# Patient Record
Sex: MALE | Race: WHITE
[De-identification: names, ages, dates, MRNs, and addresses within clinical notes are randomized per-mention and may not be internally consistent; named-entity substitution may affect disease eponyms.]

---

## 2019-07-21 NOTE — CT
PRELIMINARY REPORT/[facility]/EMERGENCY AFTER HOURS PROCEDURE:

 

EXAM:

CT Abdomen and Pelvis With Contrast

 

EXAM DATE/TIME:

7/21/2019 12:10 AM

 

CLINICAL HISTORY:

79 years old, male; Abdominal pain; Patient HX: 80 y/o m presents to ED C/O sudden onset of llq abd p
ain, that began at approx 1800. Associated abd bloating/distension, x 1 week of constipation. PT gali
es n/v, fever, diarrhea, dysuria

 

TECHNIQUE:

Imaging protocol: Axial computed tomography images of the abdomen and pelvis with intravenous contras
t.

 

COMPARISON:

No relevant prior studies available.

 

FINDINGS:

Lungs: There is bibasilar atelectatic change or scarring.

Liver: Normal. No mass.

Gallbladder and bile ducts: Normal. No calcified stones. No ductal dilation.

Pancreas: Normal. No ductal dilation.

Spleen: There are calcified splenic granulomata.

Adrenals: Normal. No mass.

Kidneys and ureters: There is moderate left hydronephrosis and hydroureter but no visible

obstructing calculus. Cannot exclude a recently passed calculus that is no longer present.

Stomach and bowel: Normal. No obstruction. No mucosal thickening.

Appendix: No evidence of appendicitis.

Intraperitoneal space: Normal. No free air. No significant fluid collection.

Vasculature: There are atherosclerotic aortic and iliac and femoral artery calcifications.

Lymph nodes: There are calcified right hilar lymph nodes.

Bladder: There is mild wall thickening involving the left aspect of the urinary bladder, cannot exclu
de

mild cystitis.

Reproductive: Unremarkable as visualized.

Bones/joints: There is diffuse osteopenia and there are degenerative changes of the spine.

Soft tissues: Unremarkable.

 

IMPRESSION:

1. There is moderate left hydronephrosis and hydroureter but no visible obstructing calculus. Cannot

exclude a recently passed calculus that is no longer present.

2. There is mild wall thickening involving the left aspect of the urinary bladder, cannot exclude mil
d

cystitis.

 

Thank you for allowing us to participate in the care of your patient.

Dictated and Authenticated by: Isiah Erickson MD

07/21/2019 1:30 AM Central Time ([place] & [place])

 

 

 

FINAL REPORT 

 

EMERGENT AFTER HOURS CT ABDOMEN AND PELVIS WITHOUT CONTRAST:

 

FINDINGS/IMPRESSION: 

I agree with the findings and impression given in the preliminary report per [facility] physician.  There 
is moderate left hydronephrosis and hydroureter.  No definite calcification is seen.  The patient may
 have recently passed a stone.

## 2019-07-21 NOTE — CON
DATE OF CONSULTATION:  07/21/2019



HISTORY OF PRESENT ILLNESS:  This is a Urology consult, I was asked to see this

patient because of pyuria, hematuria, history of prostate cancer, and left

hydronephrosis.  I am seeing him in room [facility]17 at [facility].  I have also

reviewed his most recent records from [facility], where his

urologist Dr. Matute's office is and where lot of his office notes are.  This

patient was admitted last night with left lower back pain and left-sided abdominal

pain.  This started 2 days ago, we put up with it, then it got worse causing him to

seek admission.  He was not having any fevers, any chills, any nausea or any

vomiting with this.  He has had some lower urinary tract symptoms for a number of

years, hesitancy, urgency, frequency, nocturia q.1 hour.  "I'm straining, if

urinate," and off and on hematuria.  He is seeing Dr. Matute for this.  He has had

a cysto done that was negative.  He has had urine cultures done of the last three at

[facility], which were negative, although he does have hematuria noted on

urinalysis.  He does have a distant history of cigarette smoking, quitting over 10

years ago.  To his knowledge, he never had kidney stones.  He had an ultrasound done

of his kidneys about a year ago at [facility], but did not show any stones or

hydronephrosis.  He had a noncontrast CT scan done here that showed some left hydro,

but no distal ureteral stone, some thickening of the left side of the bladder wall.

His urinalysis here showed over 50 white cells, over 50 red cells, 1+ bacteria.  His

white count was not elevated.  His creatinine was 3, which is elevated, compared to

what it normally runs.  It was 2.2 earlier this month, 1.9 before that, and a year

ago 1.7.  In terms of his prostate cancer, it was an aggressive prostate cancer.

Dr. Matute's notes from this May when he saw him indicated that was a [place]

4+4+3, primarily on the right side, 5 of 5 positive biopsies there, and 1 of 3

positive biopsies on the left side.  He was treated with I think 2 years of Lupron

and radiation.  The radiation was completed in March of 2016, and I believe, he has

been having routine followups with Dr. Medina since then.  Dr. Matute has had him on

Flomax 0.8 and Myrbetriq to help with his frequency and urgency.  He has had a low

postvoid residual in Dr. Matute's office.  His urine culture has been set up and he

was given Rocephin in the ER, and he has been started on Levaquin IV now.  He has

been receiving some morphine for pain, and he feels like his pain is improving.  He

has not had any abnormal vital signs since he has been admitted to the hospital. 



PAST MEDICAL HISTORY:  Includes a hernia repair, prostate cancer, hypertension, and

distant history of cigarette use. 



PHYSICAL EXAMINATION:

He has some mild CVA tenderness.  His abdomen is distended, mostly tympanitic with

gas.  There is some left lower quadrant discomfort, but no rebound, no guarding.  He

is not circumcised.  There is no phimosis or lesions noted.  The testicles are

descended without mass or tenderness.  Rectal exam reveals a small flat prostate. 



IMPRESSION:  

1. Pyuria, hematuria and bacteriuria.  Culture has been set up.  He is on

antibiotics.  His white count is normal.  His blood pressure is normal.  He is not

tachycardic. 

2. Left hydronephrosis, which would be a new finding.  No indication of a stone.

3. Prostate cancer.  His last PSA was undetectable and he has actually had an

undetectable PSA since October of 2017.  The last one that was checked was in May of

this year. 



PLAN:  At this point, we will continue his antibiotics.  I will check with Dr. Matute.  He probably is going to end up needing evaluation for the left hydro, and

we will look at seeing if that is something that I can do today if he desires.  The

patient has been n.p.o. or if you like to see him first.  It does not appear that he

is critically ill in terms of needing it immediately. 







Job ID:  [id number]

## 2019-07-21 NOTE — HP
CHIEF COMPLAINT:  Left flank pain.



HISTORY OF PRESENT ILLNESS:  This is a 79-year-old  male patient of Dr. Dennys Juarez, who has known prostate cancer and is being treated with radiation

who started having difficulty about a week ago with constipation and bloating and

did not feel well when the pain came on fairly suddenly yesterday in the left lower

quadrant of his abdomen and felt like it was radiating around from the back.  Denies

any nausea or vomiting.  Denies fever or chills.  Has had constipation, but denies

any hematochezia.  Denies any dysuria.  States he gets occasional clots in his urine

over the last year.  It is not a consistent issue.  He has been getting urological

treatment through Dr. Matute at [facility]. 



PAST MEDICAL HISTORY:  Positive for hypertension and prostate cancer, has been

receiving radiation treatment through Dr. Medina.  He has had a hernia repair in the

past. 



SOCIAL HISTORY:  He is .  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  He had nonspecific on his tobacco use.

He was a heavy smoker for many years, stopped 10 years ago.  He was a fairly heavy

drinker, but stopped that about 5 years ago and he is only a social drinker now. 



FAMILY HISTORY:  Noncontributory.



ALLERGIES:  NO KNOWN DRUG ALLERGIES.



MEDICATIONS:  He is on etodolac 400 mg twice a day.  He is on Ellipta inhaler three

times a day.  He is on Advair Diskus 250 three times a day.  He is on metoprolol 50

mg once a day, felodipine 5 mg once a day, lisinopril 40 mg once a day, and he is on

oxybutynin 10 mg a day. 



REVIEW OF SYSTEMS:  He denies any fevers or chills.  No trouble chewing or

swallowing.  No visual changes.  No headaches.  Denies any chest pain, hemoptysis,

or cough.  Denies any dysuria or hematuria as otherwise in the HPI.  He has had

acute onset left lower quadrant abdominal pain, but no changes to his bowel habits.

He has had constipation on and off for the last several months.  He is wondering if

that could be time to when he started his radiation.  Does note he has to void more

frequently.  He told that was a side effect of the radiation as well, but he denies

any paresis or paresthesias.  No auditory or visual hallucinations.  No suicidal or

homicidal ideations. 



PHYSICAL EXAMINATION:

VITAL SIGNS:  In the ER, blood pressure 171/72, pulse 74, respirations 18,

temperature is afebrile at 97.7, saturating 99% on room air. 

GENERAL:  With me seeing him this morning, he is sitting up in the chair beside the

hospital bed in obvious discomfort. 

HEENT:  Essentially unremarkable.  Pupils are equal, round, reactive to light and

accommodation.  Extraocular movements are intact.  Mucous membranes are slightly

dry. 

NECK:  Supple.  No JVD.  No bruits.  No thyromegaly.  No lymphadenopathy. 

LUNGS:  Clear to auscultation bilaterally, but it is difficult for him to take a

breath as it causes pain with his back. 

HEART:  S1 and S2 with no rubs, murmurs, or gallops. 

ABDOMEN:  Soft.  Mild tenderness to the left lower quadrant, otherwise negative.  No

Rovsing.  No mass.  No hepatosplenomegaly. 

EXTREMITIES:  Good palpable pulses in all 4 extremities.  No cyanosis.  No clubbing.

 No edema. 

NEUROLOGIC:  Grossly intact.  Cranial nerves 2 through 12 are equal and symmetrical.

 He is awake and alert. 



LABORATORY DATA:  White count is 8.5, hemoglobin 11.6, hematocrit 36.0, platelet

count 196, neutrophil percentage is 76, lymphocyte percentage is 17.5.  Chemistry;

sodium 136, potassium 4.8, chloride 110, bicarb 16, BUN 62, creatinine is 3.0, GFR

is 20, glucose 143, calcium is normal at 9.2, AST is 14, ALT is 13, lipase normal at

33.  Urine is yellow and turbid with 70 protein.  There is 3+ blood with 500+

leukocyte esterase.  Urine red blood cells are greater than 50.  Urine white blood

cells are greater than 50.  There are also transitional epithelial cells.  Bacteria

is 1+ and no yeasts were seen.  The CT scan of the abdomen and pelvis done in the ER

shows moderate left hydronephrosis and hydroureter with no obvious visible

obstruction cause.  There is no abnormality to any of the other organs on the screen

on the scan other than mild calcified splenic granuloma.  There are calcifications

and atherosclerosis of the aorta and iliac and femoral arteries.  There are some

calcified right hilar lymph nodes.  The bladder shows mild wall thickening on the

left side. 



ASSESSMENT:  For this gentleman is possible pyelonephritis with hydroureter and

hydronephrosis without an obvious obstruction source.  Antibiotics and IV fluids

have been started.  Urology has been consulted.  Controlling his pain with morphine. 







Job ID:  [id number]

## 2019-07-21 NOTE — RAD
RETROGRADE IVP:

 

HISTORY:

Stent placement for left-sided hydronephrosis.

 

COMPARISON:

CT abdomen and pelvis from 07/21/2019.

 

FINDINGS/IMPRESSION:

Multiple limited intraoperative fluoroscopic views from a retrograde IVP were submitted for interpret
ation.  A wire was initially seen in the left renal collecting system.  There was moderate left hydro
nephrosis.  A double-J ureteral stent was eventually placed, which appears in good position.

 

POS: [facility]

## 2019-07-21 NOTE — OP
DATE OF PROCEDURE:  07/21/2019



PREOPERATIVE DIAGNOSES:  Left hydronephrosis; urinary tract infection; history of

high-grade prostate cancer, status post radiation therapy. 



POSTOPERATIVE DIAGNOSES:  Left hydronephrosis; urinary tract infection; history of

high-grade prostate cancer, status post radiation therapy. 



PROCEDURES PERFORMED:  Cystoscopy, left retrograde, left stent.



ANESTHETIC:  General with LMA.



ESTIMATED BLOOD LOSS:  Less than 50.



DRAINS PLACED:  A 6 x 24 cm double-J stent.  A string was not left attached to this.



FINDINGS:  He had mild meatal stenosis that required us to use a 20-Namibian sheath

and we also required dilatation with Hamblen sounds from 20-Namibian to 22-Namibian.

He did not have any significant BPH.  He had a lot of friable pale mucosa along the

trigone and bladder neck with a number of abnormal appearing blood vessels

consistent with radiation.  I do not see anything that look like an obvious bladder

cancer.  The left ureteral orifice was small and very difficult to find and

cannulate.  We had to use an angle-tipped Glidewire to get through it as we could

not make the angle with the Burgess-tip catheter or green guidewire.  The left

ureter and renal pelvis and caliceal system are dilated with some proximal left

ureteral tortuosity and dilated down to the distal ureter where it tapers down as it

gets near the intramural ureter. 



DESCRIPTION OF PROCEDURE:  Obtained written and verbal consent from the patient and

after discussing the procedure with his family member and him, he was taken to the

operating suite.  He was placed in the supine position on the treatment table.

PlexiPulses were placed on his lower extremities and turned on.  He was given a

general anesthetic and oral obturator intubation.  He was then placed in a dorsal

lithotomy position.  He was sterilely prepped and draped.  C-arm was positioned so

that we could see the left side of his abdomen without difficulty.   KUB was

taken with it.  We attempted cystoscopy with a 22-Namibian sheath.  This was

unsuccessful because of some meatal stenosis, so we switched to a 20-Namibian sheath

and then used a well lubricated [place] sound to go from 20 to 22-Namibian.  This

allowed us then to pass the 20-Namibian sheath well lubricated under direct vision

with a 30-degree lens and a video camera and monitor without difficulty into the

urinary bladder.  The bladder was emptied and then filled and emptied a number of

times as we examined it.  There were what are presumed to be radiation changes along

the bladder neck and trigone.  Neither ureteral orifice was very easy to see.  The

left one was small and when we identified it, we could not get a guidewire through a

Pollack catheter to make the correct angle to get into it, so we ended up using an

angle-tipped Glidewire through a 5-Namibian Pollack catheter.  Once we were able to

make the angle and get into that left ureteral orifice, the wire went up fairly

easily.  We positioned the Glidewire until we felt it was in the renal pelvis and

placed the open-ended catheter up to that point, removed the Glidewire, and then

injected contrast through this.  It was in the proximal ureter.  There was some

tortuosity above it still, but we were able to fill out the ureter and renal pelvis

from this open-ended catheter and then we brought the angled Glidewire back and we

were able to get it up into the renal pelvis and then placed the Pollack catheter up

in the renal pelvis also.  We drained out probably about 15 mL of fluid that we sent

for a culture.  It was not purulent grossly.  We then, through this open-ended

catheter, placed 0.038 guidewire.  We removed the open-ended catheter and then

brought in a 6 x 24 Polaris double-J stent passed over the guidewire, pushing up

into place with aid of a pusher, so its proximal end allowed to coil in the dilated

renal pelvis and its distal end coiled in the bladder.  The bladder was drained.

The instruments were removed.  The patient was taken out of the dorsal lithotomy

position.  He was awakened and extubated and taken by dottie to the recovery

room. 







Job ID:  [id number]

## 2019-07-22 NOTE — PRG
DATE OF SERVICE:  07/22/2019



PRIMARY CARE PHYSICIAN:  Dennys Esquivel MD 



Postop day #1 from cystoscopy with left stent placement.



SUBJECTIVE:  The patient is feeling some better.  Denies pain.  Denies nausea or

vomiting.  He wants to go home.  He states that the blood in his urine is clearing.

He tolerated the procedure well.  Appetite is good. 



OBJECTIVE:  VITAL SIGNS:  Temperature 98.4, which is his T-max; pulse of 77;

respirations 18; blood pressure 103/62; and pulse ox 94% on room air. 

GENERAL:  He is awake and alert.  No acute distress, sitting up in the bedside

chair. 

HEENT:  Mucosa is moist. 

NECK:  Supple. 

HEART:  Regular rate and rhythm. 

LUNGS:  Decreased at bases.  No wheeze, rales, or rhonchi. 

ABDOMEN:  Obese, protuberant, nontender, and nondistended. 

EXTREMITIES:  No edema.



LABORATORY DATA:  White blood cell count 11.4 up from 8.5 yesterday, hemoglobin and

hematocrit are 11.6 and 36.4, and platelets of 221.  Sodium 137; potassium 5.1;

chloride 110; CO2 of 17; BUN and creatinine of 50 and 2.50 with a GFR of 25,

however, his baseline GFR is between 30 and 35; glucose of 151; and calcium of 9.2.

Urine cultures are pending.  Urinalysis with positive blood, positive protein,

positive white blood cells, but no bacteriuria. 



ASSESSMENT AND PLAN:  

1. This is a 79-year-old gentleman admitted with acute kidney injury and obstructive

uropathy with hydroureter and hydronephrosis.  He is now status post ureteral stent

placement on the left by Dr. Ivy, tolerating it well.  He received a dose of

Levaquin yesterday, which his kidney function should cover him for at least the next 

2 to 3 days.

2. Hypertension.  He has had low blood pressure readings.  We will hold his ACE 

inhibitor.  Also hold his beta-blocker if his blood pressure does not come up.

3. Disposition.  As per Urology.







Job ID:  [id number]

## 2019-09-11 ENCOUNTER — HOSPITAL ENCOUNTER (OUTPATIENT)
Dept: HOSPITAL 92 - BICRAD | Age: 79
Discharge: HOME | End: 2019-09-11
Attending: FAMILY MEDICINE
Payer: MEDICARE

## 2019-09-11 DIAGNOSIS — K59.00: Primary | ICD-10-CM

## 2019-09-11 PROCEDURE — 74018 RADEX ABDOMEN 1 VIEW: CPT

## 2019-09-11 NOTE — RAD
ONE VIEW ABDOMEN:

 

HISTORY: 

Constipation.

 

COMPARISON: 

None.

 

FINDINGS: 

One view abdomen demonstrates a nonspecific bowel gas pattern.  No evidence of bowel distention or di
latation.  No suspicious densities in the abdomen or pelvis.  A left-sided ureteral stent is redemons
trated and appears to be unchanged from a retrograde IVP performed on 7/21/2019.

 

IMPRESSION: 

Nonspecific bowel gas pattern.

 

POS: [facility]

## 2019-10-15 ENCOUNTER — HOSPITAL ENCOUNTER (OUTPATIENT)
Dept: HOSPITAL 92 - CT | Age: 79
Discharge: HOME | End: 2019-10-15
Attending: INTERNAL MEDICINE
Payer: MEDICARE

## 2019-10-15 DIAGNOSIS — C67.9: Primary | ICD-10-CM

## 2019-10-15 LAB — ESTIMATED GFR-MDRD - POC: 32

## 2019-10-15 PROCEDURE — 71260 CT THORAX DX C+: CPT

## 2019-10-15 PROCEDURE — 78306 BONE IMAGING WHOLE BODY: CPT

## 2019-10-15 PROCEDURE — A9503 TC99M MEDRONATE: HCPCS

## 2019-10-15 PROCEDURE — 82565 ASSAY OF CREATININE: CPT

## 2019-10-15 NOTE — CT
EXAM: CT of the chest with contrast



HISTORY: History of bladder cancer and prostate cancer



COMPARISON: 7/21/2019; no exam from 10/3/2019 available for comparison.



TECHNIQUE: Multiple contiguous axial images were obtained in a CT the chest with contrast. Coronal an
d sagittal reformats were performed.



FINDINGS:



HEART: Normal in size without focal cardiac abnormality. Calcifications are seen in the coronary jeffy
meghana.

MEDIASTINUM: No hilar or mediastinal lymphadenopathy. Calcified right hilar and mediastinal lymph nod
es are seen.



LUNGS: No focal infiltrates, suspicious nodules, or masses. A calcified granuloma seen in the right l
ower lobe.

PLEURAL SPACE: No pneumothorax or pleural effusion.



CHEST WALL SOFT TISSUES: Unremarkable

OSSEOUS STRUCTURES: Degenerative changes in the spine.

VISUALIZED SUBDIAPHRAGMATIC STRUCTURES: Hydronephrosis is again seen in the left kidney. A few small 
calcified granulomas are seen in the spleen.



IMPRESSION: 

No evidence of intrathoracic metastatic disease.



Reported By: Willie Michelle 

Electronically Signed:  10/15/2019 10:10 AM

## 2019-10-15 NOTE — NM
NUCLEAR MEDICINE BONE SCAN WHOLE BODY:

(Skeletal scintigraphy)



DATE:

10/15/2019



HISTORY:

79-year-old male with bladder cancer



TECHNIQUE:

IV injection of technetium 99m-MDP: 33 mCi

3 hour delayed whole body skeletal scintigraphy in anterior and posterior views.



FINDINGS:

There are no foci of asymmetrically increased uptake that are particularly suspicious for bone metast
ases.



IMPRESSION:

No compelling evidence of skeletal metastasis.



Reported By: Danny Zeng 

Electronically Signed:  10/15/2019 1:34 PM

## 2019-10-22 ENCOUNTER — HOSPITAL ENCOUNTER (OUTPATIENT)
Dept: HOSPITAL 92 - SDC | Age: 79
Discharge: HOME | End: 2019-10-22
Attending: SPECIALIST
Payer: MEDICARE

## 2019-10-22 VITALS — BODY MASS INDEX: 26.4 KG/M2

## 2019-10-22 DIAGNOSIS — J43.1: ICD-10-CM

## 2019-10-22 DIAGNOSIS — Z79.82: ICD-10-CM

## 2019-10-22 DIAGNOSIS — C67.9: Primary | ICD-10-CM

## 2019-10-22 DIAGNOSIS — N18.4: ICD-10-CM

## 2019-10-22 DIAGNOSIS — I12.9: ICD-10-CM

## 2019-10-22 DIAGNOSIS — Z79.899: ICD-10-CM

## 2019-10-22 DIAGNOSIS — Z87.891: ICD-10-CM

## 2019-10-22 PROCEDURE — 71045 X-RAY EXAM CHEST 1 VIEW: CPT

## 2019-10-22 PROCEDURE — 0JH63WZ INSERTION OF TOTALLY IMPLANTABLE VASCULAR ACCESS DEVICE INTO CHEST SUBCUTANEOUS TISSUE AND FASCIA, PERCUTANEOUS APPROACH: ICD-10-PCS | Performed by: SPECIALIST

## 2019-10-22 PROCEDURE — C1788 PORT, INDWELLING, IMP: HCPCS

## 2019-10-22 NOTE — RAD
FRONTAL RADIOGRAPH CHEST:

 

10/22/2019

 

HISTORY:

Evaluate chest following Mediport placement.

 

COMPARISON:

12/19/2007

 

FINDINGS:

There is blunting of the costophrenic angle on the left suggesting left pleural fluid and/or left ple
ural thickening/scar. A right-sided Port-A-Cath is present, the distal tip overlying the region of th
e cavoatrial junction. No pneumothorax. No focal consolidation or alveolar edema.

 

IMPRESSION:

Left Port-A-Cath with no pneumothorax seen.

 

POS: [facility]

## 2019-10-22 NOTE — OP
DATE OF PROCEDURE:  10/22/2019



PREOPERATIVE DIAGNOSIS:  Urothelial cancer.



POSTOPERATIVE DIAGNOSIS:  Urothelial cancer.



PROCEDURE PERFORMED:  Placement of right subclavian low-profile power compatible

MediPort. 



ANESTHESIA:  Total intravenous anesthesia with local using 0.25% Marcaine with

epinephrine. 



ASSISTANT:  Chantelle Meek, medical student.



INDICATIONS:  The patient is a 79-year-old white male.  He presents with a recent

diagnosis of a urothelial cancer.  MediPort placement is requested for chemotherapy

administration. 



DESCRIPTION OF OPERATION:  Informed consent was obtained.  The patient was taken to

the operating room where total intravenous anesthesia was obtained with the patient

in supine position.  Right periclavicular area was prepped with ChloraPrep and

draped in sterile fashion.  Local anesthetic was infiltrated and a large-gauge

needle was passed under the clavicle in the subclavian vein.  Guidewire was passed

through the needle and fluoroscopically confirmed to enter the superior vena cava.

Additional local anesthetic was infiltrated and transverse incision was created

based on needle insertion site.  A subcutaneous pocket was dissected inferiorly.

Introducer dilator was passed over the guidewire under fluoroscopic guidance.  The

guidewire and dilator were removed, and the catheter was passed through the

introducer.  The tip of the catheter was positioned at the atriocaval junction and

the catheter was trimmed to the appropriate length and secured to the locking hub of

the MediPort.  The port was then placed in the subcutaneous pocket where it was

secured to the pectoral fascia with 2 interrupted sutures of 3-0 Prolene.  The

incision was then closed in layers with 3-0 and 4-0 Monocryl.  Additional local

anesthetic was infiltrated.  The port was cannulated with a Brush needle and it

aspirated blood freely and was flushed with heparinized saline.  Dermabond was

placed externally on the skin incision.  There were no complications.  Blood loss

was negligible.  The patient tolerated the procedure well and was taken to recovery

room in stable condition. 



FINDINGS:  I used a low-profile power compatible MediPort.  The patient's anatomy

was within normal limits and there were no problems during the procedure. 



FINDINGS:  I placed a low-profile port consistent with his body habitus.  It was

placed uneventfully into the right subclavian vein.  There was essentially no blood

loss and no complications.  The patient tolerated the procedure well. 







Job ID:  [id number]

## 2019-11-26 ENCOUNTER — HOSPITAL ENCOUNTER (INPATIENT)
Dept: HOSPITAL 92 - IMCU/EMU | Age: 79
LOS: 4 days | Discharge: HOME | DRG: 862 | End: 2019-11-30
Attending: INTERNAL MEDICINE | Admitting: INTERNAL MEDICINE
Payer: MEDICARE

## 2019-11-26 VITALS — BODY MASS INDEX: 24.9 KG/M2

## 2019-11-26 DIAGNOSIS — D61.810: ICD-10-CM
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
## 2019-11-26 DIAGNOSIS — Z79.899: ICD-10-CM

## 2019-11-26 DIAGNOSIS — T45.1X5A: ICD-10-CM

## 2019-11-26 DIAGNOSIS — R50.81: ICD-10-CM

## 2019-11-26 DIAGNOSIS — E87.2: ICD-10-CM

## 2019-11-26 DIAGNOSIS — E86.0: ICD-10-CM

## 2019-11-26 DIAGNOSIS — I12.9: ICD-10-CM

## 2019-11-26 DIAGNOSIS — D70.9: ICD-10-CM

## 2019-11-26 DIAGNOSIS — N17.9: ICD-10-CM

## 2019-11-26 DIAGNOSIS — Z87.891: ICD-10-CM

## 2019-11-26 DIAGNOSIS — T81.44XA: Primary | ICD-10-CM

## 2019-11-26 DIAGNOSIS — B95.61: ICD-10-CM

## 2019-11-26 DIAGNOSIS — D63.1: ICD-10-CM

## 2019-11-26 DIAGNOSIS — E44.0: ICD-10-CM

## 2019-11-26 DIAGNOSIS — D64.81: ICD-10-CM

## 2019-11-26 DIAGNOSIS — N18.4: ICD-10-CM

## 2019-11-26 DIAGNOSIS — C61: ICD-10-CM

## 2019-11-26 DIAGNOSIS — C67.9: ICD-10-CM

## 2019-11-26 DIAGNOSIS — Z79.1: ICD-10-CM

## 2019-11-26 DIAGNOSIS — N12: ICD-10-CM

## 2019-11-26 DIAGNOSIS — Z79.82: ICD-10-CM

## 2019-11-26 DIAGNOSIS — Z93.6: ICD-10-CM

## 2019-11-26 LAB
ALBUMIN SERPL BCG-MCNC: 3 G/DL (ref 3.4–4.8)
ALP SERPL-CCNC: 50 U/L (ref 40–110)
ALT SERPL W P-5'-P-CCNC: 13 U/L (ref 8–55)
ANION GAP SERPL CALC-SCNC: 16 MMOL/L (ref 10–20)
APTT PPP: 36 SEC (ref 22.9–36.1)
APTT PPP: 37.4 SEC (ref 22.9–36.1)
AST SERPL-CCNC: 16 U/L (ref 5–34)
BACTERIA UR QL AUTO: (no result) HPF
BASOPHILS # BLD AUTO: 0 THOU/UL (ref 0–0.2)
BASOPHILS NFR BLD AUTO: 0 % (ref 0–1)
BILIRUB SERPL-MCNC: 0.6 MG/DL (ref 0.2–1.2)
BUN SERPL-MCNC: 85 MG/DL (ref 8.4–25.7)
CALCIUM SERPL-MCNC: 8 MG/DL (ref 7.8–10.44)
CHLORIDE SERPL-SCNC: 104 MMOL/L (ref 98–107)
CK MB SERPL-MCNC: 0.9 NG/ML (ref 0–6.6)
CO2 SERPL-SCNC: 17 MMOL/L (ref 23–31)
CREAT CL PREDICTED SERPL C-G-VRATE: 15 ML/MIN (ref 70–130)
D DIMER PPP FEU-MCNC: 3.59 *MCG/ML (ref 0.27–0.43)
EOSINOPHIL # BLD AUTO: 0 THOU/UL (ref 0–0.7)
EOSINOPHIL NFR BLD AUTO: 0.9 % (ref 0–10)
FIBRINOGEN PPP-MCNC: 1007 MG/DL (ref 253–463)
FSP-QUALITATIVE: (no result)
FSP-SEMIQUANTITATIVE: (no result) MCG/ML (ref ?–5)
GLOBULIN SER CALC-MCNC: 3.2 G/DL (ref 2.4–3.5)
GLUCOSE SERPL-MCNC: 152 MG/DL (ref 83–110)
HGB BLD-MCNC: 4.4 G/DL (ref 14–18)
HGB BLD-MCNC: 6.4 G/DL (ref 14–18)
INR PPP: 1.3
INR PPP: 1.3
LEUKOCYTE ESTERASE UR QL STRIP.AUTO: 250 LEU/UL
LYMPHOCYTES # BLD: 0.2 THOU/UL (ref 1.2–3.4)
LYMPHOCYTES NFR BLD AUTO: 32.2 % (ref 21–51)
MCH RBC QN AUTO: 27.4 PG (ref 27–31)
MCH RBC QN AUTO: 28.5 PG (ref 27–31)
MCV RBC AUTO: 79.2 FL (ref 78–98)
MCV RBC AUTO: 80.4 FL (ref 78–98)
MONOCYTES # BLD AUTO: 0.1 THOU/UL (ref 0.11–0.59)
MONOCYTES NFR BLD AUTO: 14.1 % (ref 0–10)
NEUTROPHILS # BLD AUTO: 0.4 THOU/UL (ref 1.4–6.5)
NEUTROPHILS NFR BLD AUTO: 52.9 % (ref 42–75)
PLATELET # BLD AUTO: 17 THOU/UL (ref 130–400)
PLATELET # BLD AUTO: 17 THOU/UL (ref 130–400)
PLATELET # BLD AUTO: 23 THOU/UL (ref 130–400)
POTASSIUM SERPL-SCNC: 5 MMOL/L (ref 3.5–5.1)
PROT UR STRIP.AUTO-MCNC: 200 MG/DL
PROT UR-MCNC: 283 MG/DL (ref 1–14)
PROTHROMBIN TIME: 16.5 SEC (ref 12–14.7)
PROTHROMBIN TIME: 16.6 SEC (ref 12–14.7)
RBC # BLD AUTO: 1.53 MILL/UL (ref 4.7–6.1)
RBC # BLD AUTO: 2.34 MILL/UL (ref 4.7–6.1)
SODIUM SERPL-SCNC: 132 MMOL/L (ref 136–145)
SODIUM UR-SCNC: 38 MMOL/L
TROPONIN I SERPL DL<=0.01 NG/ML-MCNC: 0.04 NG/ML (ref ?–0.03)
UUN UR-MCNC: 544 MG/DL
WBC # BLD AUTO: 0.2 THOU/UL (ref 4.8–10.8)
WBC # BLD AUTO: 0.4 THOU/UL (ref 4.8–10.8)

## 2019-11-26 PROCEDURE — C9113 INJ PANTOPRAZOLE SODIUM, VIA: HCPCS

## 2019-11-26 PROCEDURE — 81001 URINALYSIS AUTO W/SCOPE: CPT

## 2019-11-26 PROCEDURE — 85384 FIBRINOGEN ACTIVITY: CPT

## 2019-11-26 PROCEDURE — 85049 AUTOMATED PLATELET COUNT: CPT

## 2019-11-26 PROCEDURE — 30233N1 TRANSFUSION OF NONAUTOLOGOUS RED BLOOD CELLS INTO PERIPHERAL VEIN, PERCUTANEOUS APPROACH: ICD-10-PCS | Performed by: INTERNAL MEDICINE

## 2019-11-26 PROCEDURE — 82553 CREATINE MB FRACTION: CPT

## 2019-11-26 PROCEDURE — 36415 COLL VENOUS BLD VENIPUNCTURE: CPT

## 2019-11-26 PROCEDURE — 76770 US EXAM ABDO BACK WALL COMP: CPT

## 2019-11-26 PROCEDURE — 87086 URINE CULTURE/COLONY COUNT: CPT

## 2019-11-26 PROCEDURE — 84484 ASSAY OF TROPONIN QUANT: CPT

## 2019-11-26 PROCEDURE — 82533 TOTAL CORTISOL: CPT

## 2019-11-26 PROCEDURE — 80048 BASIC METABOLIC PNL TOTAL CA: CPT

## 2019-11-26 PROCEDURE — 84300 ASSAY OF URINE SODIUM: CPT

## 2019-11-26 PROCEDURE — 84540 ASSAY OF URINE/UREA-N: CPT

## 2019-11-26 PROCEDURE — 87186 SC STD MICRODIL/AGAR DIL: CPT

## 2019-11-26 PROCEDURE — 82570 ASSAY OF URINE CREATININE: CPT

## 2019-11-26 PROCEDURE — 86850 RBC ANTIBODY SCREEN: CPT

## 2019-11-26 PROCEDURE — 36430 TRANSFUSION BLD/BLD COMPNT: CPT

## 2019-11-26 PROCEDURE — 93010 ELECTROCARDIOGRAM REPORT: CPT

## 2019-11-26 PROCEDURE — P9016 RBC LEUKOCYTES REDUCED: HCPCS

## 2019-11-26 PROCEDURE — 85300 ANTITHROMBIN III ACTIVITY: CPT

## 2019-11-26 PROCEDURE — 87077 CULTURE AEROBIC IDENTIFY: CPT

## 2019-11-26 PROCEDURE — 93005 ELECTROCARDIOGRAM TRACING: CPT

## 2019-11-26 PROCEDURE — 80053 COMPREHEN METABOLIC PANEL: CPT

## 2019-11-26 PROCEDURE — 83605 ASSAY OF LACTIC ACID: CPT

## 2019-11-26 PROCEDURE — 84156 ASSAY OF PROTEIN URINE: CPT

## 2019-11-26 PROCEDURE — 87040 BLOOD CULTURE FOR BACTERIA: CPT

## 2019-11-26 PROCEDURE — 71045 X-RAY EXAM CHEST 1 VIEW: CPT

## 2019-11-26 PROCEDURE — 86901 BLOOD TYPING SEROLOGIC RH(D): CPT

## 2019-11-26 PROCEDURE — 85025 COMPLETE CBC W/AUTO DIFF WBC: CPT

## 2019-11-26 PROCEDURE — 85379 FIBRIN DEGRADATION QUANT: CPT

## 2019-11-26 PROCEDURE — 85610 PROTHROMBIN TIME: CPT

## 2019-11-26 PROCEDURE — 74176 CT ABD & PELVIS W/O CONTRAST: CPT

## 2019-11-26 PROCEDURE — 85730 THROMBOPLASTIN TIME PARTIAL: CPT

## 2019-11-26 PROCEDURE — 85362 FIBRIN DEGRADATION PRODUCTS: CPT

## 2019-11-26 PROCEDURE — 86900 BLOOD TYPING SEROLOGIC ABO: CPT

## 2019-11-26 PROCEDURE — 82565 ASSAY OF CREATININE: CPT

## 2019-11-26 PROCEDURE — 80202 ASSAY OF VANCOMYCIN: CPT

## 2019-11-26 RX ADMIN — HYDROCODONE BITARTRATE AND ACETAMINOPHEN PRN TAB: 5; 325 TABLET ORAL at 17:29

## 2019-11-26 NOTE — HP
CHIEF COMPLAINT:  Fever and generalized weakness.



HISTORY OF PRESENT ILLNESS:  A 79-year-old male with known history of prostate

cancer, status post radiotherapy, bladder cancer associated with hydronephrosis,

status post stent placement and subsequently left nephrostomy tube placement and

recently started on chemotherapy with cisplatin and gemfibrozil on November 12, 2019.  The patient has had two doses of chemotherapy with last one being on November 19, 2019.  Since commencement of chemotherapy, the patient has developed poor

appetite, generalized weakness, poor oral intake and earlier today developed fever

with T-max of 101.8, hence was called up to come to the Cancer Center where he was

found to be hypotensive.  Further evaluation with __________ 7.6, and platelet of

41, as well as a WBC count of 1.  Impression of neutropenic fever was made and the

patient was treated with 2.5 L of normal saline as well as 2 g of cefepime at the

Cancer Center and was subsequently directly admitted to the [facility] for further

evaluation and treatment.  Of note, on November 19, 2019, WBC count was 4.2,

hemoglobin was 9.4, and platelet was 249.  The patient complains of lower abdominal

pain as well as difficulty urinating. He has not been able to pass urine through the

penis, with only urine drainage being from the left nephrostomy tube.  He denied

chest pain, cough, shortness of breath, nausea, vomiting.  Oral intake has been

minimal with last bowel movement being about several days ago. 



PAST MEDICAL HISTORY:  

1. Hypertension.

2. Prostate cancer.

3. Bladder cancer.

4. Hydronephrosis.

5. Chronic kidney disease, stage 4.



PAST SURGICAL HISTORY:  

1. Cystoscopy with stent placement.

2. Hernia repair.

3. Left nephrostomy tube placement.

4. Placement of right subclavian MediPort.



FAMILY HISTORY:  Reviewed, but noncontributory.



SOCIAL HISTORY:  The patient is .  He lives with spouse.  He is a former

heavy smoker, but quit about 10 years ago.  He used to drink heavily, but stopped

about 5 years ago.  Currently, he is only a social drinker, but denied recreational

drug use. 



ALLERGIES:  NO KNOWN DRUG ALLERGIES REPORTED.



MEDICATIONS:  Prior to hospital medications:

1. Aspirin 325 mg p.o. daily.

2. Nexium 20 mg p.o. daily.

3. Felodipine 5 mg p.o. daily.

4. Fluticasone/salmeterol (Advair Diskus) 250/50 one inhalation daily.

5. Flomax 0.4 mg p.o. b.i.d.

6. Metoprolol succinate 50 mg p.o. daily. 

Please note that home medications are not complete as spouse did not come with home

medications. 



REVIEW OF SYSTEMS:  A 12-point review of system performed was negative other than

pertinent positives and negatives included in the history of present illness. 



PHYSICAL EXAMINATION:

VITAL SIGNS:  On presentation to the [facility], the patient was found to have

blood pressure of 88/45.  Following IV fluid therapy with 2.5 L of normal saline,

blood pressure came up to 95/56.  Most current vitals here in the IMCU shows

temperature 98.4, pulse 93, respiratory rate 24, SpO2 100% on room air, blood

pressure 104/62. 

GENERAL:  Acutely ill-looking elderly, in some distress.  Afebrile.  Anicteric

acyanotic. 

HEENT:  Normocephalic, atraumatic.  Oral mucosa is dry. 

NECK:  Supple.  Nontender with good range of motion.  No lymphadenopathy or masses

or JVD appreciated. 

CARDIOVASCULAR:  Regular rhythm and rate with normal heart sounds 1 and 2. 

RESPIRATORY:  Fair air entry bilateral with few transmitted breath sounds.  No

obvious crackle or rhonchi or use of accessory muscles appreciated. 

GI:  Abdomen is full, soft, with bilateral suprapubic and lower abdominal

tenderness. 

UROGENITAL:  Left nephrostomy tube noted draining some urine.  Penis and scrotum are

grossly normal with no erythema or edema. 

EXTREMITIES:  Grossly normal looking atraumatic with no edema or erythema. 

SKIN:  Rather dry. 

CNS:  Conscious, alert, oriented x3 with appropriate mental status.  Some memory

lapses noted.  Cranial nerves 2 through 12 are grossly intact.  The patient moves

all extremities but weakly. 



DIAGNOSTIC DATA:  CBC at the [facility] showed WBC count of 1 with absolute

neutrophil count of 0.6.  Hemoglobin is 7.6, MCV is 78.5, platelet is 41.  Of note,

on November 19, WBC was 4.9, hemoglobin was 9.4, platelet was 249. 



Creatinine obtained at the [facility] showed 4.55.  Of note, creatinine was 2.01

on November 19. 



ASSESSMENT:  

1. Severe sepsis.

2. Severe neutropenia with absolute neutrophil count less than 600 and associated

with fever. 

3. Hypotension.

4. Volume depletion.

5. Acute on chronic renal failure.

6. Severe pancytopenia.

7. Presumed right renal obstruction as the patient has not been able to pass urine.

8. Left hydronephrosis, status post nephrostomy tube.

9. Prostate cancer, status post radiation therapy.

10. Invasive urothelial cancer, recently started on chemotherapy with gemfibrozil

and cisplatin. 

11. Mild-to-moderate protein calorie malnutrition.



PLAN:  

1. We will get blood culture, urine culture, urinalysis as well as stat CBC, CMP,

lactic acid, coagulation panel, DIC panel as well as troponin and EKG. 

2. We will start broad-spectrum antibiotic therapy with vancomycin and cefepime

renally dosed. 

3. We will also get CT scan of the abdomen and pelvis with oral contrast.

4. We will also get chest x-ray.

5. We will consult Urology and Hematology- Oncology.

6. DVT prophylaxis with SCDs will be provided.

7. Code status, full code.  Spouse is the surrogate decision maker.

8. Further treatment depending on hospital course and review of other diagnostic

test. 







Job ID:  [id number]

## 2019-11-26 NOTE — CT
CT ABDOMEN AND PELVIS WITHOUT IV CONTRAST:

11/26/19

 

HISTORY: 

Sepsis and acute renal failure. 

 

FINDINGS: 

Absence of oral and IV contrast reduces the sensitivity of exam particularly for evaluation of solid 
organs. Oral contrast was administered. 

 

Evidence of old granulomatous disease is again seen in the lower chest as on 7/21/19. No free air or 
free fluid is seen in the abdomen or pelvis. No calcified gallstones are noted. There are calcified g
ranulomas in the spleen. A normal appearing appendix is seen. The small bowel loops are not abnormall
y dilated. 

 

There is a left sided nephrostomy tube and a left sided ureteral stent. There is a right sided hydrou
reteronephrosis without evidence of an obstructing calculus. No calculi is seen in the kidneys, urete
rs or urinary bladder. 

 

There are vascular calcifications without evidence of aneurysmal dilatation of the abdominal aorta. T
here are degenerative changes in the spine. 

 

IMPRESSION: 

Right sided hydroureteronephrosis without evidence of obstructing calculus. A recently passed calculu
s cannot be excluded. 

 

POS: SAMANTA

## 2019-11-26 NOTE — RAD
Chest AP view



INDICATION: Sepsis



COMPARISON: October 22, 2019



FINDINGS:



Lungs:The lungs are clear



Cardiac silhouette:Prominent fat pad along the left cardiac margin is stable. No definite airspace co
nsolidation or pleural effusion is evident. There are calcified granuloma within the right lower

lobe.



Pulmonary vasculature:Normal



Pleural spaces:No pleural effusion or pneumothorax is demonstrated.



Upper abdomen:No abnormality seen.



Osseous structures: No acute osseous abnormality.



Additional findings:Right subclavian chest wall port is stable



IMPRESSION: No acute cardiopulmonary abnormality.



Reported By: Karson Santiago 

Electronically Signed:  11/26/2019 4:34 PM

## 2019-11-27 LAB
ALBUMIN SERPL BCG-MCNC: 2.6 G/DL (ref 3.4–4.8)
ALP SERPL-CCNC: 44 U/L (ref 40–110)
ALT SERPL W P-5'-P-CCNC: 14 U/L (ref 8–55)
ANION GAP SERPL CALC-SCNC: 15 MMOL/L (ref 10–20)
APTT PPP: 39.2 SEC (ref 22.9–36.1)
AST SERPL-CCNC: 17 U/L (ref 5–34)
BILIRUB SERPL-MCNC: 1.6 MG/DL (ref 0.2–1.2)
BUN SERPL-MCNC: 79 MG/DL (ref 8.4–25.7)
CALCIUM SERPL-MCNC: 7.6 MG/DL (ref 7.8–10.44)
CHLORIDE SERPL-SCNC: 107 MMOL/L (ref 98–107)
CO2 SERPL-SCNC: 16 MMOL/L (ref 23–31)
CREAT CL PREDICTED SERPL C-G-VRATE: 18 ML/MIN (ref 70–130)
D DIMER PPP FEU-MCNC: 4.66 *MCG/ML (ref 0.27–0.43)
FIBRINOGEN PPP-MCNC: 881 MG/DL (ref 253–463)
FSP-QUALITATIVE: (no result)
FSP-SEMIQUANTITATIVE: (no result) MCG/ML (ref ?–5)
GLOBULIN SER CALC-MCNC: 2.9 G/DL (ref 2.4–3.5)
GLUCOSE SERPL-MCNC: 106 MG/DL (ref 83–110)
HGB BLD-MCNC: 6.9 G/DL (ref 14–18)
HGB BLD-MCNC: 7.3 G/DL (ref 14–18)
INR PPP: 1.4
MCH RBC QN AUTO: 27.6 PG (ref 27–31)
MCH RBC QN AUTO: 27.8 PG (ref 27–31)
MCV RBC AUTO: 80.4 FL (ref 78–98)
MCV RBC AUTO: 82 FL (ref 78–98)
PLATELET # BLD AUTO: 15 THOU/UL (ref 130–400)
PLATELET # BLD AUTO: 15 THOU/UL (ref 130–400)
PLATELET # BLD AUTO: 19 THOU/UL (ref 130–400)
POTASSIUM SERPL-SCNC: 4.3 MMOL/L (ref 3.5–5.1)
PROTHROMBIN TIME: 17.2 SEC (ref 12–14.7)
RBC # BLD AUTO: 2.51 MILL/UL (ref 4.7–6.1)
RBC # BLD AUTO: 2.63 MILL/UL (ref 4.7–6.1)
SODIUM SERPL-SCNC: 134 MMOL/L (ref 136–145)
VANCOMYCIN SERPL-MCNC: 4.8 UG/ML
WBC # BLD AUTO: 0.3 THOU/UL (ref 4.8–10.8)
WBC # BLD AUTO: 0.5 THOU/UL (ref 4.8–10.8)

## 2019-11-27 RX ADMIN — PIPERACILLIN SODIUM, TAZOBACTAM SODIUM SCH MLS: 2; .25 INJECTION, POWDER, LYOPHILIZED, FOR SOLUTION INTRAVENOUS at 21:12

## 2019-11-27 RX ADMIN — HYDROCODONE BITARTRATE AND ACETAMINOPHEN PRN TAB: 5; 325 TABLET ORAL at 15:57

## 2019-11-27 RX ADMIN — PIPERACILLIN SODIUM, TAZOBACTAM SODIUM SCH MLS: 2; .25 INJECTION, POWDER, LYOPHILIZED, FOR SOLUTION INTRAVENOUS at 15:58

## 2019-11-27 RX ADMIN — PIPERACILLIN SODIUM, TAZOBACTAM SODIUM SCH MLS: 2; .25 INJECTION, POWDER, LYOPHILIZED, FOR SOLUTION INTRAVENOUS at 03:37

## 2019-11-27 RX ADMIN — HYDROCODONE BITARTRATE AND ACETAMINOPHEN PRN TAB: 5; 325 TABLET ORAL at 03:34

## 2019-11-27 RX ADMIN — PIPERACILLIN SODIUM, TAZOBACTAM SODIUM SCH MLS: 2; .25 INJECTION, POWDER, LYOPHILIZED, FOR SOLUTION INTRAVENOUS at 10:29

## 2019-11-27 RX ADMIN — HYDROCODONE BITARTRATE AND ACETAMINOPHEN PRN TAB: 5; 325 TABLET ORAL at 22:11

## 2019-11-27 NOTE — CON
DATE OF CONSULTATION:  



HISTORY OF PRESENT ILLNESS:  Bandar Mckeon is a 79-year-old gentleman, who was admitted

to the hospital with fever, chills, sweats, and hypertension. 



When he sees his oncologist, he is febrile and hypertensive. 



He has known history of prostate cancer, apparently is in recent chemotherapy, who

has a left-sided nephrostomy tube in place for hydronephrosis.  He sees a doctor at

[facility].  He has received chemotherapy recently, became pancytopenic and was

sent to the hospital for operative intervention. 



He is a former smoker.  No prior history of TB, pneumonia, or asthma. 



He is unable to make any urine in spite of having a nephrostomy tube on the left

side. 



PAST MEDICAL HISTORY:  Prostate cancer, bladder cancer, hydronephrosis, left renal

failure, and hypertension. 



PAST SURGICAL HISTORY:  Including a left nephrostomy tube, recent MediPort, and

hernia surgery. 



SOCIAL HISTORY:  Tobacco abuse, quit smoking 10 years ago.



HOME MEDICATIONS:  Include; 

1. Lisinopril 20.

2. Advair 250.

3. Nexium.

4. Felodipine 5.

5. Hydrochlorothiazide.

6. Hydrocodone.

7. Toprol-XL 50.

8. Aspirin.



ALLERGIES:  NONE.



REVIEW OF SYSTEMS:  Unremarkable.



PHYSICAL EXAMINATION:

VITAL SIGNS:  On examination, his blood pressure is 85/44, pulse 93, saturations 99%

on room air, and respiratory rate 18. 

CHEST:  No wheezing or crackles. 

CARDIAC:  Normal S1 and S2.  No gallops. 

ABDOMEN:  Soft, distended.



LABORATORY DATA:  White count is 0.3, H and H of 7 and 21, platelet count 15,000.

He is pancytopenic.  Creatinine is 3.4, BUN 79.  Urine shows pus and blood. 



IMPRESSION:  

1. Urosepsis.

2. Hypertension.

3. Pancytopenia.

4. Status post chemotherapy, prostate and bladder cancer.

5. Left nephrostomy tube.

6. Former smoker.



PLAN:  He is on broad-spectrum antibiotics.  This is for the renal failure, which I

will continue.  More than likely source of sepsis is urinary.  Continue supportive

care. 



Transfusion as needed.  Await input from Oncology. 



I have ordered a cortisol level. 



Consultation note, 70 minutes, 50% direct patient care.  We will follow.







Job ID:  [id number]

## 2019-11-27 NOTE — EKG
Test Reason : STAT

Blood Pressure : ***/*** mmHG

Vent. Rate : 088 BPM     Atrial Rate : 092 BPM

   P-R Int : 000 ms          QRS Dur : 082 ms

    QT Int : 316 ms       P-R-T Axes : 000 -19 022 degrees

   QTc Int : 382 ms

 

Accelerated Junctional rhythm

Abnormal ECG

When compared with ECG of 18-OCT-2019 12:30,

Junctional rhythm has replaced Sinus rhythm

Vent. rate has increased BY  42 BPM

Minimal criteria for Anterior infarct are no longer Present

Confirmed by JUAN ONEILL,  SBianca (4) on 11/27/2019 6:45:28 PM

 

Referred By: DINORAH DUNNE           Confirmed By:DR. SHANDRA MARTINEZ MD

## 2019-11-27 NOTE — CON
DATE OF CONSULTATION:  



REASON FOR CONSULTATION:  Bladder cancer.



HISTORY OF PRESENT ILLNESS:  A 79-year-old  male with history of muscle

invasive bladder cancer, currently on neoadjuvant chemotherapy with gemcitabine and

cisplatin, last dose received on 11/19, presenting with fever, weakness, decreased

oral intake, and pancytopenia.  The patient called in to the clinic with fever and

dehydration and advised to come to the Cancer Clinic, where he received 2.5 L of IV

fluids and 2 g of cefepime.  His blood pressure was an 80 systolic and did not

improve with fluid resuscitation and so was admitted to the hospital.  In clinic,

his white blood cells were 1, platelets of 41, and hemoglobin 7.6.  The patient was

directly admitted into the ICU clinic.  The patient had a chest x-ray, and chest,

abdomen, and pelvis without contrast, that was unremarkable.  The patient's white

blood cells trended down further and currently are 0.3; hemoglobin 6.4, status post

1 unit of packed red blood cells up to 7.3; and platelets dropped to 15.  The

patient's creatinine was 4.55 in clinic, and after fluid resuscitation, it is

currently 3.46 with a baseline of approximately 1.6 to 1.8.  The patient's PT, PTT

were bit elevated along with D-dimer, fibrinogen, and fibrin split products.  His

urine shows protein, blood, leukocytes, and bacteria, and all cultures are currently

pending.  He spiked another fever of 101.8 early this morning.  The patient states

that he is feeling better since admission, but still feels very weak and has left

flank pain continually. 



REVIEW OF SYSTEMS:  Ten-point review of systems is negative except as per HPI.



PAST MEDICAL HISTORY:  

1. Bladder cancer.

2. Prostate cancer.

3. CKD.

4. Hydronephrosis.

5. Hypertension.



PAST SURGICAL HISTORY:  

1. Cystoscopy.

2. Hernia repair.

3. Left nephrostomy tube.

4. MediPort placement.



FAMILY HISTORY:  Noncontributory.



SOCIAL HISTORY:  Former smoker and heavy drinker.  Currently, social drinker.  Lives

with his wife. 



ALLERGIES:  NO KNOWN DRUG ALLERGIES.



CURRENT MEDICATIONS:  Reviewed.



PHYSICAL EXAMINATION:

VITAL SIGNS:  Temperature 98.3, pulse 73, blood pressure 95/52, respirations 18,

saturating 100% on room air. 

GENERAL APPEARANCE:  The patient is sitting up, eating breakfast. 

HEENT:  Normocephalic, atraumatic. 

NECK:  Supple. 

CARDIOVASCULAR:  Regular rate. 

LUNGS:  Respirations are clear and nonlabored. 

GASTROINTESTINAL:  No abdominal tenderness, but does have left flank tenderness. 

UROGENITAL:  Left nephrostomy tube in place. 

EXTREMITIES:  No edema. 

SKIN:  No rash. 

NEUROLOGIC:  Cranial nerves 2 through 12 are grossly intact.



LABORATORY DATA:  Hemoglobin 7.3, white blood cells 0.3, platelets 15.  PT 17.2, PTT

39.2, fibrinogen 881, fibrin degradation products abnormal, D-dimer 4.66, BUN 79,

creatinine 3.46, sodium 134, potassium 4.3, calcium 7.6, albumin 2.6.  Troponin

0.037.  Urine; 200 protein, 3+ blood, 250 leukocyte esterase, greater than 50 red

cells and white cells, 3+ bacteria. 



IMAGING DATA:  Chest x-ray and CT of chest, abdomen, pelvis without contrast is

unremarkable. 



ASSESSMENT AND PLAN:  A 79-year-old  male with muscle invasive bladder

cancer, on neoadjuvant gemcitabine and cisplatin, presenting with febrile

neutropenia, severe sepsis, and acute-on-chronic kidney disease.  Unknown source of

infection, but suspicious for urine or kidney infection, given UA findings and left

flank pain.  The patient is currently on Zosyn and vancomycin and recommend

continuing these until he is afebrile, neutrophils are at least 1.0.  The patient

should remain in the ICU until his blood pressure has recovered.  Follow up blood

cultures and consult Urology for recommendations as the patient is currently

draining fluids in the nephrostomy tube, but not urinating at all and may have a

blockage.  We will continue to follow along with you. 







Job ID:  [id number]

## 2019-11-27 NOTE — PDOC.HOSPP
- Subjective


Encounter Date: 11/27/19


Encounter Time: 10:48


Subjective: 


80 y/o male with invasive bladder cancer complicated by hydronephrosis s/p Left 

nephrostomy tube, recently started on chemo admitted with fever associated with 

malaise and poor orqal intake. Found to have severe sepsis and severe 

pancytopenia. Marginally better today. Fever has subsided.





- Objective


Vital Signs & Weight: 


 Vital Signs (12 hours)











  Temp Pulse Ox


 


 11/27/19 11:15  98.3 F 


 


 11/27/19 07:48   99


 


 11/27/19 07:46  98.3 F 


 


 11/27/19 04:00  101.8 F H 








 Weight











Admit Weight                   159 lb 4.8 oz


 


Weight                         159 lb 4.8 oz











 Most Recent Monitor Data











Heart Rate from ECG            96


 


NIBP                           120/55


 


NIBP BP-Mean                   76


 


Respiration from ECG           25


 


SpO2                           85














I&O: 


 











 11/26/19 11/27/19 11/28/19





 06:59 06:59 06:59


 


Intake Total  3875 


 


Output Total  1500 550


 


Balance  2375 -550











Result Diagrams: 


 11/27/19 03:27





 11/27/19 03:27





Hospitalist ROS





- Medication


Medications: 


Active Medications











Generic Name Dose Route Start Last Admin





  Trade Name Freq  PRN Reason Stop Dose Admin


 


Hydrocodone Bitart/Acetaminophen  1 tab  11/26/19 15:56  11/27/19 03:34





  [place] 5/325  PO   1 tab





  Q4H PRN   Administration





  Moderate Pain (4-6)   





     





     





     


 


Sodium Chloride  1,000 mls @ 125 mls/hr  11/26/19 16:00  11/27/19 09:23





  Normal Saline 0.9%  IV   1,000 mls





  .Q8H PREET   Administration





     





     





     





     


 


Vancomycin HCl 500 mg/ Sodium  100 mls @ 100 mls/hr  11/26/19 18:00  11/26/19 18

:54





  Chloride  IVPB   100 mls





  1800 PREET   Administration





     





     





     





     


 


Piperacillin Sod/Tazobactam  100 mls @ 200 mls/hr  11/27/19 04:00  11/27/19 10:

29





  Sod 2.25 gm/ Sodium Chloride  IVPB   100 mls





  0400,1000,1600,2200 PREET   Administration





     





     





     





     


 


Ondansetron HCl  4 mg  11/26/19 15:56  11/26/19 17:21





  Zofran  IVP   4 mg





  Q6H PRN   Administration





  Nausea/Vomiting   





     





     





     


 


Pantoprazole Sodium  40 mg  11/27/19 09:00  11/27/19 10:29





  Protonix  PO   40 mg





  DAILY PREET   Administration





     





     





     





     














- Exam


General Appearance: awake alert


ENT: normocephalic atraumatic, moist mucosa


Neck: symmetric


Heart: RRR


Respiratory: no wheezes, no rales, no ronchi, normal chest expansion


Gastrointestinal: soft, non-distended, normal bowel sounds


Extremities: no cyanosis, no edema


Neurological: cranial nerve grossly intact, no focal deficits


Psychiatric: A&O x 3





Hosp A/P


(1) Severe sepsis


Code(s): A41.9 - SEPSIS, UNSPECIFIED ORGANISM; R65.20 - SEVERE SEPSIS WITHOUT 

SEPTIC SHOCK   Status: Acute   





(2) Neutropenic fever


Code(s): D70.9 - NEUTROPENIA, UNSPECIFIED; R50.81 - FEVER PRESENTING WITH 

CONDITIONS CLASSIFIED ELSEWHERE   Status: Acute   





(3) Pancytopenia


Code(s): D61.818 - OTHER PANCYTOPENIA   Status: Acute   





(4) AMRIT (acute kidney injury)


Code(s): N17.9 - ACUTE KIDNEY FAILURE, UNSPECIFIED   Status: Acute   





(5) CKD (chronic kidney disease) stage 4, GFR 15-29 ml/min


Code(s): N18.4 - CHRONIC KIDNEY DISEASE, STAGE 4 (SEVERE)   Status: Acute   





(6) Hydronephrosis


Code(s): N13.30 - UNSPECIFIED HYDRONEPHROSIS   Status: Acute   





(7) Moderate protein-calorie malnutrition


Code(s): E44.0 - MODERATE PROTEIN-CALORIE MALNUTRITION   Status: Acute   





(8) Prostate CA


Code(s): C61 - MALIGNANT NEOPLASM OF PROSTATE   Status: Acute   





(9) Bladder cancer


Status: Acute   





(10) Acute on chronic anemia


Code(s): D64.9 - ANEMIA, UNSPECIFIED   Status: Acute   





- Plan


Continue broad spectrum antibiotics.


Pancytopenia treatmemnt as per hematology.


Awaiting urology evaluation.


Follow CBC and renal function


Start oral supplement.

## 2019-11-28 LAB
ANION GAP SERPL CALC-SCNC: 14 MMOL/L (ref 10–20)
BUN SERPL-MCNC: 65 MG/DL (ref 8.4–25.7)
CALCIUM SERPL-MCNC: 7.9 MG/DL (ref 7.8–10.44)
CHLORIDE SERPL-SCNC: 113 MMOL/L (ref 98–107)
CO2 SERPL-SCNC: 17 MMOL/L (ref 23–31)
CREAT CL PREDICTED SERPL C-G-VRATE: 23 ML/MIN (ref 70–130)
GLUCOSE SERPL-MCNC: 146 MG/DL (ref 83–110)
HGB BLD-MCNC: 6.7 G/DL (ref 14–18)
MCH RBC QN AUTO: 28.2 PG (ref 27–31)
MCV RBC AUTO: 81.7 FL (ref 78–98)
PLATELET # BLD AUTO: 23 THOU/UL (ref 130–400)
POTASSIUM SERPL-SCNC: 3.6 MMOL/L (ref 3.5–5.1)
RBC # BLD AUTO: 2.38 MILL/UL (ref 4.7–6.1)
SODIUM SERPL-SCNC: 140 MMOL/L (ref 136–145)
WBC # BLD AUTO: 0.5 THOU/UL (ref 4.8–10.8)

## 2019-11-28 RX ADMIN — VANCOMYCIN HYDROCHLORIDE SCH MLS: 1 INJECTION, SOLUTION INTRAVENOUS at 16:12

## 2019-11-28 RX ADMIN — PIPERACILLIN SODIUM, TAZOBACTAM SODIUM SCH MLS: 2; .25 INJECTION, POWDER, LYOPHILIZED, FOR SOLUTION INTRAVENOUS at 09:45

## 2019-11-28 RX ADMIN — HYDROCODONE BITARTRATE AND ACETAMINOPHEN PRN TAB: 5; 325 TABLET ORAL at 16:12

## 2019-11-28 RX ADMIN — PIPERACILLIN SODIUM, TAZOBACTAM SODIUM SCH MLS: 2; .25 INJECTION, POWDER, LYOPHILIZED, FOR SOLUTION INTRAVENOUS at 03:04

## 2019-11-28 RX ADMIN — HYDROCODONE BITARTRATE AND ACETAMINOPHEN PRN TAB: 5; 325 TABLET ORAL at 11:26

## 2019-11-28 RX ADMIN — DOCUSATE SODIUM 50 MG AND SENNOSIDES 8.6 MG PRN TAB: 8.6; 5 TABLET, FILM COATED ORAL at 21:13

## 2019-11-28 RX ADMIN — PIPERACILLIN SODIUM, TAZOBACTAM SODIUM SCH MLS: 2; .25 INJECTION, POWDER, LYOPHILIZED, FOR SOLUTION INTRAVENOUS at 21:14

## 2019-11-28 RX ADMIN — PIPERACILLIN SODIUM, TAZOBACTAM SODIUM SCH MLS: 2; .25 INJECTION, POWDER, LYOPHILIZED, FOR SOLUTION INTRAVENOUS at 15:57

## 2019-11-28 RX ADMIN — HYDROCODONE BITARTRATE AND ACETAMINOPHEN PRN TAB: 5; 325 TABLET ORAL at 06:09

## 2019-11-28 NOTE — ULT
RENAL ULTRASOUND



HISTORY: Acute kidney injury with right-sided hydronephrosis



COMPARISON: CT abdomen and pelvis without contrast dated November 26, 2019.



FINDINGS: 



Right Kidney:

Size:  10.5 x 5.7 x 5.3 cm

Abnormality: There is a mild right hydronephrosis. This is stable to the prior CT examination when ac
counting for slight differences in technique.



Left Kidney:

Size:  11.0 x 6.5 x 3.7 cm

Abnormality: No left-sided hydronephrosis. Small echogenic focus seen within the renal sinus likely c
orresponds to the patient's left ureteral stent and percutaneous nephrostomy tube.



Urinary bladder: Decompressed. Small echogenic focus within the lumen likely reflects patient's known
 left ureteral stent.





IMPRESSION: Stable mild right hydronephrosis. Left-sided renal collecting system and ureteral stent a
nd percutaneous drain.



Reported By: Karson Santiago 

Electronically Signed:  11/28/2019 10:52 AM

## 2019-11-28 NOTE — PDOC.HOSPP
- Subjective


Encounter Date: 11/28/19


Encounter Time: 12:56


Subjective: 


78 y/o male with invasive bladder cancer complicated by hydronephrosis s/p Left 

nephrostomy tube, recently started on chemo admitted with fever associated with 

malaise and poor oral intake. Found to have severe sepsis and severe 

pancytopenia. Feeling better with improving appetite and oral intake.





- Objective


Vital Signs & Weight: 


 Vital Signs (12 hours)











  Temp


 


 11/28/19 10:42  98.2 F


 


 11/28/19 07:09  98.6 F


 


 11/28/19 03:40  98.1 F








 Weight











Admit Weight                   159 lb 4.8 oz


 


Weight                         159 lb 4.8 oz











 Most Recent Monitor Data











Heart Rate from ECG            64


 


NIBP                           113/47


 


NIBP BP-Mean                   69


 


Respiration from ECG           23


 


SpO2                           99














I&O: 


 











 11/27/19 11/28/19 11/29/19





 06:59 06:59 06:59


 


Intake Total 3875 3725 


 


Output Total 1500 2325 


 


Balance 2375 1400 











Result Diagrams: 


 11/28/19 11:07





 11/28/19 11:07





Hospitalist ROS





- Medication


Medications: 


Active Medications











Generic Name Dose Route Start Last Admin





  Trade Name Freq  PRN Reason Stop Dose Admin


 


Hydrocodone Bitart/Acetaminophen  1 tab  11/26/19 15:56  11/28/19 11:26





  [place] 5/325  PO   1 tab





  Q4H PRN   Administration





  Moderate Pain (4-6)   





     





     





     


 


Piperacillin Sod/Tazobactam  100 mls @ 200 mls/hr  11/27/19 04:00  11/28/19 09:

45





  Sod 2.25 gm/ Sodium Chloride  IVPB   100 mls





  0400,1000,1600,2200 PREET   Administration





     





     





     





     


 


Ondansetron HCl  4 mg  11/26/19 15:56  11/26/19 17:21





  Zofran  IVP   4 mg





  Q6H PRN   Administration





  Nausea/Vomiting   





     





     





     


 


Pantoprazole Sodium  40 mg  11/27/19 09:00  11/28/19 09:45





  Protonix  PO   40 mg





  DAILY PREET   Administration





     





     





     





     














- Exam


General Appearance: awake alert


Eye: anicteric sclera


ENT: normocephalic atraumatic


Neck: supple, no JVD


Heart: RRR


Respiratory: no ronchi, normal chest expansion


Gastrointestinal: soft, non-distended, normal bowel sounds


Extremities: no cyanosis, no edema


Neurological: cranial nerve grossly intact, no focal deficits


Psychiatric: A&O x 3





Hosp A/P


(1) Severe sepsis


Code(s): A41.9 - SEPSIS, UNSPECIFIED ORGANISM; R65.20 - SEVERE SEPSIS WITHOUT 

SEPTIC SHOCK   Status: Acute   





(2) Neutropenic fever


Code(s): D70.9 - NEUTROPENIA, UNSPECIFIED; R50.81 - FEVER PRESENTING WITH 

CONDITIONS CLASSIFIED ELSEWHERE   Status: Acute   





(3) Pancytopenia


Code(s): D61.818 - OTHER PANCYTOPENIA   Status: Acute   





(4) AMRIT (acute kidney injury)


Code(s): N17.9 - ACUTE KIDNEY FAILURE, UNSPECIFIED   Status: Acute   





(5) CKD (chronic kidney disease) stage 4, GFR 15-29 ml/min


Code(s): N18.4 - CHRONIC KIDNEY DISEASE, STAGE 4 (SEVERE)   Status: Acute   





(6) Hydronephrosis


Code(s): N13.30 - UNSPECIFIED HYDRONEPHROSIS   Status: Acute   





(7) Moderate protein-calorie malnutrition


Code(s): E44.0 - MODERATE PROTEIN-CALORIE MALNUTRITION   Status: Acute   





(8) Prostate CA


Code(s): C61 - MALIGNANT NEOPLASM OF PROSTATE   Status: Acute   





(9) Bladder cancer


Status: Acute   





(10) Acute on chronic anemia


Code(s): D64.9 - ANEMIA, UNSPECIFIED   Status: Acute   





(11) Pyelonephritis


Code(s): N12 - TUBULO-INTERSTITIAL NEPHRITIS, NOT SPCF AS ACUTE OR CHRONIC   

Status: Acute   





(12) Metabolic acidosis


Code(s): E87.2 - ACIDOSIS   Status: Acute   





- Plan


Continue broad spectrum antibiotics.


Pancytopenia treatmemnt as per hematology.


Follow CBC and renal function


Consult ID


For Transfusion of platelet is surgical intervention is scheduled by urology.


Substitute NS with sodium bicarbonate containing fluid due to worsening 

hyperchloremic acidosis

## 2019-11-28 NOTE — CON
DATE OF CONSULTATION:  11/28/2019



CONSULTING:  Dr. Martinez.



CONSULTED:  Dr. Mccurdy.



REASON FOR CONSULTATION:  Acute kidney injury with hydronephrosis.



HISTORY OF PRESENT ILLNESS:  Mr. Mckeon is an is 79-year-old white male with history

of prostate cancer and muscle invasive bladder cancer.  He is currently undergoing

chemotherapy and has previously received radiation for the prostate cancer.  He

developed hydronephrosis bilaterally worse on the left, for which Dr. Ivy had

taken him for ureteral stent in the past.  Subsequently, the patient had persistent

hydronephrosis with pain and subsequently got a nephrostomy tube placed on the left

side.  The patient was stable for a period of time, but recently got admitted to the

hospital approximately 2 days ago, at which time he was found to have

mild-to-moderate right-sided hydronephrosis on a CT as well as neutropenic fevers

with a significantly depressed white count, platelet count, and anemia.  He was

admitted to the hospital for treatment and I have been consulted for further

assistance.  The patient's creatinine has improved some with hydration as the

patient was found to be significantly dehydrated by the Nephrology Team.  His

creatinine is currently 3.46, which is improved from 4.07.  He is currently

complaining of left-sided flank pain secondary to his stent nephrostomy tube.  Other

than that, he states he is feeling much better since he has received antibiotics and

IV fluids.  Dr. Herron is also following the patient from an oncology standpoint.

The patient is currently in the IMCU.  He has not reported any urine output from the

bladder and states that he has no desire or urge to urinate.  All of his urine has

been coming out of the left-sided nephrostomy tube only. 



ALLERGIES:  NONE.



CURRENT HOME MEDICATIONS:  

1. Lisinopril.

2. Fluticasone.

3. Multivitamins.

4. Nexium.

5. Felodipine.

6. Triamterene/hydrochlorothiazide.

7. Norco 5/325.

8. Metoprolol.

9. Aspirin, which is currently being held.



PAST MEDICAL HISTORY:  

1. Muscle invasive bladder cancer.

2. Prostate cancer.

3. Chronic kidney disease.

4. Hydronephrosis.

5. Hypertension.



PAST SURGICAL HISTORY:  

1. Cystoscopy with left ureteral stent placement.

2. Hernia repair.

3. Left nephrostomy tube placement recently.

4. MediPort placement.



FAMILY HISTORY:  Noncontributory.



SOCIAL HISTORY:  The patient is a previous smoker and heavy drinker, although he

states he has quit.  He does drink alcoholic beverages only rarely at the current

time.  He denies any illicit drug use.  He lives with his wife. 



REVIEW OF SYSTEMS:  A 12-point review of systems was reviewed and negative other

than what was commented on the HPI.  Specifically, the left flank pain and

discomfort, although he again states that he is feeling better.  He currently does

not report any fevers. 



PHYSICAL EXAMINATION:

VITAL SIGNS:  Temperature 98.6, pulse 68, respirations 19, blood pressure 88/35,

saturations 99% on room air. 

GENERAL:  No apparent distress.  Communicative and alert.  Appears slightly pale and

appears stated age. 

HEENT:  Normocephalic and atraumatic.  Pupils are symmetric and round.  Sclerae are

nonicteric.  Trachea midline. 

CARDIOVASCULAR:  Regular rate and rhythm.  Normal S1 and S2.  Symmetric pulses. 

CHEST:  No increased work of breathing.  Symmetric expansion of the lungs, clear

anteriorly. 

ABDOMEN:  Soft, nontender, and nondistended.  Positive bowel sounds.  Very mild SP

tenderness. 

GENITOURINARY:  Deferred at this time. 

RECTAL:  Not performed secondary to neutropenia. 

EXTREMITIES:  No cyanosis or edema.  There is clubbing on the upper extremity

digits. 

SKIN:  Warm, dry.  No rashes or lesions.  Good turgor. 

MUSCULOSKELETAL:  No joint deformities or joint erythema noted.  Range of motion was

not tested. 

NEUROLOGIC:  Cranial nerves 2 through 12 grossly intact.  There are no obvious focal

or sensory motor deficits identified. 

PSYCHIATRIC:  Alert and oriented x3.  Appropriate mood and affect. 

BACK:  Left-sided nephrostomy tube in place, draining clear yellow urine.



LABORATORY DATA:  Full set of labs are in the [facility] system, which I have

reviewed.  Of note, the patient's white count is currently 0.3 with a platelet count

of 15,000.  INR is currently 1.4.  Creatinine is 3.46, which is improved from 4.07.

Troponins were elevated, but are now decreasing and trending downwards.  Urine

culture preliminary is growing Staphylococcus aureus.  Blood cultures thus far have

been negative. 



IMAGING STUDIES:  CT from November 26th demonstrates right-sided mild-to-moderate

hydronephrosis and hydroureter down to the level of bladder without evidence of

obstructing calculus.  A left ureteral stent and left-sided nephrostomy tube are

currently in place.  The bladder is decompressed without any obvious masses or blood

clot. 



ASSESSMENT AND PLAN:  A 79-year-old white male with acute kidney injury, presumably

secondary to dehydration with obstructive uropathy with mild sided hydronephrosis on

the right, which is currently still persistent, and left side, which is decompressed

with a stent and nephrostomy tube.  Currently, I am not entirely clear whether or

not all of his urine is coming out of the left-sided nephrostomy tube as if the

right side is only partially obstructed and the patient is urinating into his

bladder.  The urine would then reflux up the ureteral stent and out the left-sided

nephrostomy tube.  Alternatively, the patient may have anuria from the right side

due to complete obstruction, although this would not be compatible with only

mild-to-moderate hydronephrosis.  Since the creatinine is improving, I would

recommend getting another renal ultrasound today and see if the hydronephrosis is

worsening.  If it is stable and the patient's creatinine continues to improve, I

would continue monitoring only and not recommend any kind of surgical intervention

as the patient is extremely high risk for surgical procedures given his neutropenia

and thrombocytopenia for bleeding and worsening infection.  However, if the

patient's creatinine begins rising or the hydronephrosis becomes severe on the right

side, then I would give consideration to ureteral stent.  On the attempts at

ureteral stent on the right, although I have discussed this with Dr. Parham in

Radiology and stated that I do not think ureteral stent will be successful and a

nephrostomy tube would be the fewest number of procedures to get the patient

drained.  Dr. Parham feels that the nephrostomy tube carries too much risk in the

setting of thrombocytopenia and neutropenia and recommended that at least ureteral

stent be attempted first.  Therefore, if the patient needs the procedure due to

worsening hydronephrosis or creatinine, then we will plan for ureteral stent

placement after the patient has received platelets up to 50,000, at which point, if

a stent is unsuccessful or unable to be placed, then Dr. Parham agreed that they

would take him for attempts for a nephrostomy tube.  I have discussed this all with

the patient including risks of the procedure if they were necessary including

sepsis, worsening infection, bleeding, and death given his high risk situation.  He

understands and states that he is willing to proceed forward with procedures if

necessary, but agrees with the plan of conservative measures first if possible.  I

have spoken with Dr. Parham and Dr. Martinez personally to coordinate care, and more

than 50 minutes were spent in direct coordination of care, counseling, and

examination of this patient. 







Job ID:  [id number]

## 2019-11-28 NOTE — PRG
DATE OF SERVICE:  11/28/2019



SUBJECTIVE:  Mr. Mckeon has no new complaints.



OBJECTIVE:  VITAL SIGNS:  His temperature is staying down, heart rates in the 60s,

blood pressure 127/74, respiratory rate 22. 

LUNGS:  Clear. 

HEART:  Regular rhythm. 

ABDOMEN:  Soft.



LABORATORY DATA:  White count 500, hemoglobin 6.7.  Electrolytes are normal.

Creatinine is 2.7. 



IMPRESSION:  

1. Neutropenic fever.

2. Acute on chronic renal disease.

3. Anemia secondary to chemo combined with anemia of chronic disease.  He would

likely benefit from a transfusion at this point.  We will continue with empiric

antimicrobial therapy.  He had urine that grew out Staph out, but it is unclear

whether or not this is the cause of the fever. 







Job ID:  [id number]

## 2019-11-28 NOTE — PDOC.MOPN
Interval History: 





Pt's left flank pain has improved, but still present. Appetite and energy 

better.





- Vital Signs


Vital Signs: 


 Vital Signs (12 hours)











  Temp


 


 11/28/19 10:42  98.2 F


 


 11/28/19 07:09  98.6 F


 


 11/28/19 03:40  98.1 F


 


 11/27/19 23:48  98.2 F








 Weight











Admit Weight                   159 lb 4.8 oz


 


Weight                         159 lb 4.8 oz











 Most Recent Monitor Data











Heart Rate from ECG            70


 


NIBP                           107/56


 


NIBP BP-Mean                   73


 


Respiration from ECG           14


 


SpO2                           96

















- Physical Exam


General: Alert, Oriented x3, Cooperative


HEENT: Atraumatic, EOMI


Lungs: Normal air movement


Cardiovascular: Regular rate


Abdomen: Other (left flank tenderness)


Neurological: Strength at 5/5 X4 ext


Psych/Mental Status: Mood NL





- Labs


Result Diagrams: 


 11/27/19 03:27





 11/27/19 03:27


Lab results: 


 Laboratory Results - last 24 hr





11/27/19 17:13: Random Vancomycin 4.8











A/P





- Plan


Plan: 





Cont antibiotics for Staph Aureus pyelonephritis


Neupogen qday until ANC 1.0


PRN transfusions: Hb < 7, Plts < 10 or < 50 for procedure


Dr. Mccurdy planning nephrostomy tube tomorrow - plan platelet transfusion 

tonight in preparation


Ok to transfer to Oncology once BP is stable

## 2019-11-29 LAB
ANION GAP SERPL CALC-SCNC: 14 MMOL/L (ref 10–20)
BUN SERPL-MCNC: 56 MG/DL (ref 8.4–25.7)
CALCIUM SERPL-MCNC: 8.2 MG/DL (ref 7.8–10.44)
CHLORIDE SERPL-SCNC: 111 MMOL/L (ref 98–107)
CO2 SERPL-SCNC: 19 MMOL/L (ref 23–31)
CREAT CL PREDICTED SERPL C-G-VRATE: 26 ML/MIN (ref 70–130)
GLUCOSE SERPL-MCNC: 110 MG/DL (ref 83–110)
HGB BLD-MCNC: 8.8 G/DL (ref 14–18)
MCH RBC QN AUTO: 27.6 PG (ref 27–31)
MCV RBC AUTO: 80.7 FL (ref 78–98)
MDIFF COMPLETE?: YES
PLATELET # BLD AUTO: 48 THOU/UL (ref 130–400)
POTASSIUM SERPL-SCNC: 3.5 MMOL/L (ref 3.5–5.1)
RBC # BLD AUTO: 3.17 MILL/UL (ref 4.7–6.1)
SODIUM SERPL-SCNC: 140 MMOL/L (ref 136–145)
WBC # BLD AUTO: 2.2 THOU/UL (ref 4.8–10.8)

## 2019-11-29 RX ADMIN — PIPERACILLIN SODIUM, TAZOBACTAM SODIUM SCH MLS: 2; .25 INJECTION, POWDER, LYOPHILIZED, FOR SOLUTION INTRAVENOUS at 10:03

## 2019-11-29 RX ADMIN — HYDROCODONE BITARTRATE AND ACETAMINOPHEN PRN TAB: 5; 325 TABLET ORAL at 10:07

## 2019-11-29 RX ADMIN — PIPERACILLIN SODIUM, TAZOBACTAM SODIUM SCH MLS: 2; .25 INJECTION, POWDER, LYOPHILIZED, FOR SOLUTION INTRAVENOUS at 21:50

## 2019-11-29 RX ADMIN — HYDROCODONE BITARTRATE AND ACETAMINOPHEN PRN TAB: 5; 325 TABLET ORAL at 04:42

## 2019-11-29 RX ADMIN — PIPERACILLIN SODIUM, TAZOBACTAM SODIUM SCH MLS: 2; .25 INJECTION, POWDER, LYOPHILIZED, FOR SOLUTION INTRAVENOUS at 04:39

## 2019-11-29 RX ADMIN — VANCOMYCIN HYDROCHLORIDE SCH MLS: 1 INJECTION, SOLUTION INTRAVENOUS at 18:24

## 2019-11-29 RX ADMIN — HYDROCODONE BITARTRATE AND ACETAMINOPHEN PRN TAB: 5; 325 TABLET ORAL at 17:02

## 2019-11-29 RX ADMIN — DOCUSATE SODIUM 50 MG AND SENNOSIDES 8.6 MG PRN TAB: 8.6; 5 TABLET, FILM COATED ORAL at 16:44

## 2019-11-29 RX ADMIN — PIPERACILLIN SODIUM, TAZOBACTAM SODIUM SCH MLS: 2; .25 INJECTION, POWDER, LYOPHILIZED, FOR SOLUTION INTRAVENOUS at 16:35

## 2019-11-29 RX ADMIN — HYDROCODONE BITARTRATE AND ACETAMINOPHEN PRN TAB: 5; 325 TABLET ORAL at 21:49

## 2019-11-29 NOTE — PRG
DATE OF SERVICE:  11/29/2019



SUBJECTIVE:  The patient states he is feeling fine.  He feels a little bit better.

He is not complaining of any significant pain other than chronic ongoing left-sided

flank pain secondary to stent, nephrostomy tube.  The patient's ultrasound yesterday

demonstrated stable hydronephrosis and his creatinine has been improving. 



OBJECTIVE:  VITAL SIGNS:  Temperature 98.2, pulse 82, respirations 21, blood

pressure 120/43, saturations 100% on room air. 

GENERAL:  No apparent distress.  Communicative and alert. 

CARDIOVASCULAR:  Regular rate and rhythm. 

ABDOMEN:  Soft, nontender, nondistended.  Positive bowel sounds. 

BACK:  Left-sided nephrostomy tube in place, draining clear yellow urine. 

EXTREMITIES:  No clubbing, cyanosis, or edema.



LABORATORY DATA:  The full set of labs are in the [employer] system, which I have

reviewed.  Of note, the patient's white count is now 2.2 with a platelet count of

48,000, creatinine is improved down to 2.35, which is better than yesterday. 



IMAGING STUDIES:  I have reviewed the ultrasound myself and it demonstrates mild

hydronephrosis on the right.  No hydronephrosis on the left. 



ASSESSMENT AND PLAN:  A 79-year-old white male with history of prostate cancer,

status post XRT and muscle invasive bladder cancer, currently undergoing

chemotherapy with neutropenia and infection.  He has stable mild right

hydronephrosis, which does not appear to be causing any significant obstruction with

hydration antibiotics.  His creatinine seems to be improving significantly.  I do

suspect now that his urine from the right kidney is going down to his bladder,

refluxing up the ureteral stent and out the left nephrostomy tube.  Therefore, the

left nephrostomy tube is capturing all of his urine output and can be used for

monitoring his I's and O's.  From my standpoint, there is no need for surgical

intervention.  I would not recommend nephrostomy tube at 

this time nor would I recommend any kind of ureteral stent on the right.  Procedure

based intervention should be minimized given his neutropenia.  Decision for when to

re- 

initiate chemotherapy will largely be based off the patient's oncologist, Dr. Herron.

 I will continue to follow and be available as necessary. 







Job ID:  [id number]

## 2019-11-29 NOTE — PRG
DATE OF SERVICE:  11/29/2019



SUBJECTIVE:  He feels much better.  He has no acute complaints.



OBJECTIVE:  VITAL SIGNS:  Temperature 98.2, pulse 83, blood pressure 115/40, O2

saturation 100%. 

HEENT:  Unremarkable. 

NECK:  No adenopathy or JVD. 

CHEST:  Clear. 

CARDIAC:  S1, S2.  Regular. 

ABDOMEN:  Soft. 

EXTREMITIES:  No edema.



LABORATORY DATA:  White blood cell count 2.2, hematocrit 25.6, and platelet count

48.  Sodium 140, potassium 3.5, chloride 111, CO2 of 19, BUN 56, creatinine 2.3,

glucose 110. 



ASSESSMENT:  

1. Sepsis with neutropenia, now improving.

2. Pancytopenia, improving.



PLAN:  He will transfer to the Oncology unit and continue antibiotics.  There are no

further Pulmonary/Critical Care concerns at this time.  We will sign off and be

available as needed. 







Job ID:  [id number]

## 2019-11-29 NOTE — PDOC.HOSPP
- Subjective


Encounter Date: 11/29/19


Encounter Time: 09:30


Subjective: 


78 y/o male with invasive bladder cancer complicated by hydronephrosis s/p Left 

nephrostomy tube, recently started on chemo admitted with fever associated with 

malaise and poor oral intake. Found to have severe sepsis and severe 

pancytopenia. No new problem. feeling better. Oral intake still poor.





- Objective


Vital Signs & Weight: 


 Vital Signs (12 hours)











  Temp Pulse Ox


 


 11/29/19 11:17  97.0 F L 


 


 11/29/19 08:00   100


 


 11/29/19 07:21  98.2 F 


 


 11/29/19 03:25  99.2 F 








 Weight











Admit Weight                   159 lb 4.8 oz


 


Weight                         159 lb 4.8 oz











 Most Recent Monitor Data











Heart Rate from ECG            84


 


NIBP                           121/74


 


NIBP BP-Mean                   89


 


Respiration from ECG           17


 


SpO2                           100














I&O: 


 











 11/28/19 11/29/19 11/30/19





 06:59 06:59 06:59


 


Intake Total 3725 2150 


 


Output Total 2325 2000 


 


Balance 1400 150 











Result Diagrams: 


 11/29/19 04:30





 11/29/19 04:30





Hospitalist ROS





- Medication


Medications: 


Active Medications











Generic Name Dose Route Start Last Admin





  Trade Name Freq  PRN Reason Stop Dose Admin


 


Hydrocodone Bitart/Acetaminophen  1 tab  11/26/19 15:56  11/29/19 10:07





  Ward 5/325  PO   1 tab





  Q4H PRN   Administration





  Moderate Pain (4-6)   





     





     





     


 


Piperacillin Sod/Tazobactam  100 mls @ 200 mls/hr  11/27/19 04:00  11/29/19 10:

03





  Sod 2.25 gm/ Sodium Chloride  IVPB   100 mls





  0400,1000,1600,2200 PREET   Administration





     





     





     





     


 


Vancomycin HCl 1 gm/ Device  200 mls @ 200 mls/hr  11/28/19 18:00  11/28/19 16:

12





  IVPB   200 mls





  1800 PREET   Administration





     





     





     





     


 


Sodium Bicarbonate 75 meq/  1,075 mls @ 100 mls/hr  11/28/19 13:00  11/28/19 15:

57





  Sodium Chloride  IV   1,075 mls





  INF PREET   Administration





     





     





     





     


 


Melatonin  6 mg  11/27/19 22:24  11/28/19 21:14





  Melatonin  PO   6 mg





  HS PRN   Administration





  Insomnia   





     





     





     


 


Ondansetron HCl  4 mg  11/26/19 15:56  11/26/19 17:21





  Zofran  IVP   4 mg





  Q6H PRN   Administration





  Nausea/Vomiting   





     





     





     


 


Pantoprazole Sodium  40 mg  11/27/19 09:00  11/29/19 10:03





  Protonix  PO   40 mg





  DAILY PREET   Administration





     





     





     





     


 


Senna/Docusate Sodium  2 tab  11/26/19 15:56  11/28/19 21:13





  Senokot S  PO   2 tab





  BIDPRN PRN   Administration





  Constipation   





     





     





     


 


Tbo-Filgrastim  480 mcg  11/29/19 09:00  11/29/19 10:09





  Granix  SC   480 mcg





  DAILY PREET   Administration





     





     





     





     














- Exam


General Appearance: awake alert


Eye: anicteric sclera


ENT: normocephalic atraumatic


Neck: supple, symmetric, no JVD


Heart: RRR


Respiratory: no wheezes, no rales, no ronchi, normal chest expansion


Gastrointestinal: soft, non-tender, non-distended, normal bowel sounds


Extremities: no cyanosis


Extremities - other findings: mild bilateral leg edema


Neurological: cranial nerve grossly intact, no focal deficits


Musculoskeletal: generalized weakness


Psychiatric: A&O x 3





Hosp A/P


(1) Severe sepsis


Code(s): A41.9 - SEPSIS, UNSPECIFIED ORGANISM; R65.20 - SEVERE SEPSIS WITHOUT 

SEPTIC SHOCK   Status: Acute   





(2) Neutropenic fever


Code(s): D70.9 - NEUTROPENIA, UNSPECIFIED; R50.81 - FEVER PRESENTING WITH 

CONDITIONS CLASSIFIED ELSEWHERE   Status: Acute   





(3) Pancytopenia


Code(s): D61.818 - OTHER PANCYTOPENIA   Status: Acute   





(4) AMRIT (acute kidney injury)


Code(s): N17.9 - ACUTE KIDNEY FAILURE, UNSPECIFIED   Status: Acute   





(5) CKD (chronic kidney disease) stage 4, GFR 15-29 ml/min


Code(s): N18.4 - CHRONIC KIDNEY DISEASE, STAGE 4 (SEVERE)   Status: Acute   





(6) Hydronephrosis


Code(s): N13.30 - UNSPECIFIED HYDRONEPHROSIS   Status: Acute   





(7) Moderate protein-calorie malnutrition


Code(s): E44.0 - MODERATE PROTEIN-CALORIE MALNUTRITION   Status: Acute   





(8) Prostate CA


Code(s): C61 - MALIGNANT NEOPLASM OF PROSTATE   Status: Acute   





(9) Bladder cancer


Status: Acute   





(10) Acute on chronic anemia


Code(s): D64.9 - ANEMIA, UNSPECIFIED   Status: Acute   





(11) Pyelonephritis


Code(s): N12 - TUBULO-INTERSTITIAL NEPHRITIS, NOT SPCF AS ACUTE OR CHRONIC   

Status: Acute   





(12) Metabolic acidosis


Code(s): E87.2 - ACIDOSIS   Status: Acute   





- Plan


Decrease IVF rate to 50 cc.hr.


Continue broad spectrum antibiotics.


Pancytopenia treatment as per hematology. getting neupogen with good response


Follow CBC and renal function


Transfer to oncology.


Continue oral supplementation.

## 2019-11-29 NOTE — PDOC.MOPN
Interval History: 





Pt feeling much better today and wishes to go home. Still w/left flank pain but 

improving.





- Vital Signs


Vital Signs: 


 Vital Signs (12 hours)











  Temp Pulse Ox


 


 11/29/19 11:17  97.0 F L 


 


 11/29/19 08:00   100


 


 11/29/19 07:21  98.2 F 


 


 11/29/19 03:25  99.2 F 








 Weight











Admit Weight                   159 lb 4.8 oz


 


Weight                         159 lb 4.8 oz











 Most Recent Monitor Data











Heart Rate from ECG            84


 


NIBP                           121/74


 


NIBP BP-Mean                   89


 


Respiration from ECG           17


 


SpO2                           100

















- Physical Exam


General: Alert, Oriented x3, Cooperative


HEENT: Atraumatic


Lungs: Normal air movement


Cardiovascular: Regular rate


Abdomen: Other (left flank tenderness)


Neurological: Cranial nerves 3-12 NL


Psych/Mental Status: Mood NL





- Labs


Result Diagrams: 


 11/29/19 04:30





 11/29/19 04:30


Lab results: 


 Laboratory Results - last 24 hr





11/29/19 04:30: WBC 2.2 L, RBC 3.17 L, Hgb 8.8 L, Hct 25.6 L, MCV 80.7, MCH 27.6

, MCHC 34.2, RDW 13.1, Plt Count 48 L, MPV 9.4, Neutrophils % (Manual) 38 L, 

Band Neuts % (Manual) 6, Lymphocytes % (Manual) 30, Monocytes % (Manual) 26 H, 

Nucleated RBCs # (Man) 2 H, Hypochromia SLIGHT = 6-15 cells, Plt Morphology 

Comment Appears Decreased L


11/29/19 04:30: Sodium 140, Potassium 3.5, Chloride 111 H, Carbon Dioxide 19 L, 

Anion Gap 14, BUN 56 H, Creatinine 2.35 H, Estimated GFR (MDRD) 27, Glucose 110

, Calcium 8.2











A/P





- Problem


(1) AMRIT (acute kidney injury)


Current Visit: Yes   Code(s): N17.9 - ACUTE KIDNEY FAILURE, UNSPECIFIED   Status

: Acute   





(2) Bladder cancer


Current Visit: Yes   Status: Acute   





(3) Neutropenic fever


Current Visit: Yes   Code(s): D70.9 - NEUTROPENIA, UNSPECIFIED; R50.81 - FEVER 

PRESENTING WITH CONDITIONS CLASSIFIED ELSEWHERE   Status: Acute   





- Plan


Plan: 





Cont antibiotics for Staph Aureus pyelonephritis


Neupogen qday until ANC 1.0


PRN transfusions: Hb < 7, Plts < 10 or < 50 for procedure


If continued improvement tomorrow and ANC 1.0 then ok for DC home from 

Oncologic standpoint w/total 10-14d of antibiotics

## 2019-11-30 VITALS — TEMPERATURE: 98.2 F | DIASTOLIC BLOOD PRESSURE: 61 MMHG | SYSTOLIC BLOOD PRESSURE: 122 MMHG

## 2019-11-30 LAB
ANION GAP SERPL CALC-SCNC: 12 MMOL/L (ref 10–20)
BUN SERPL-MCNC: 52 MG/DL (ref 8.4–25.7)
CALCIUM SERPL-MCNC: 7.9 MG/DL (ref 7.8–10.44)
CHLORIDE SERPL-SCNC: 109 MMOL/L (ref 98–107)
CO2 SERPL-SCNC: 22 MMOL/L (ref 23–31)
CREAT CL PREDICTED SERPL C-G-VRATE: 28 ML/MIN (ref 70–130)
GLUCOSE SERPL-MCNC: 97 MG/DL (ref 83–110)
HGB BLD-MCNC: 6.6 G/DL (ref 14–18)
MCH RBC QN AUTO: 27.7 PG (ref 27–31)
MCV RBC AUTO: 80.9 FL (ref 78–98)
MDIFF COMPLETE?: YES
PLATELET # BLD AUTO: 101 THOU/UL (ref 130–400)
POTASSIUM SERPL-SCNC: 3.5 MMOL/L (ref 3.5–5.1)
RBC # BLD AUTO: 2.39 MILL/UL (ref 4.7–6.1)
SODIUM SERPL-SCNC: 139 MMOL/L (ref 136–145)
WBC # BLD AUTO: 8.7 THOU/UL (ref 4.8–10.8)

## 2019-11-30 RX ADMIN — HYDROCODONE BITARTRATE AND ACETAMINOPHEN PRN TAB: 5; 325 TABLET ORAL at 12:19

## 2019-11-30 RX ADMIN — HYDROCODONE BITARTRATE AND ACETAMINOPHEN PRN TAB: 5; 325 TABLET ORAL at 03:33

## 2019-11-30 RX ADMIN — PIPERACILLIN SODIUM, TAZOBACTAM SODIUM SCH MLS: 2; .25 INJECTION, POWDER, LYOPHILIZED, FOR SOLUTION INTRAVENOUS at 03:59

## 2019-11-30 RX ADMIN — HYDROCODONE BITARTRATE AND ACETAMINOPHEN PRN TAB: 5; 325 TABLET ORAL at 16:28

## 2019-11-30 NOTE — PDOC.HOSPP
- Subjective


Encounter Date: 11/30/19


Encounter Time: 08:08


Subjective: 


78 y/o male with invasive bladder cancer complicated by hydronephrosis s/p Left 

nephrostomy tube, recently started on chemo admitted with fever associated with 

malaise and poor oral intake. Found to have severe sepsis and severe 

pancytopenia. Started on broadspectrum antibiotics and IVF. Feeling better. 

desires to be discharged. No new problem. 





- Objective


Vital Signs & Weight: 


 Vital Signs (12 hours)











  Temp Pulse Resp BP BP Pulse Ox


 


 11/30/19 08:00  98.6 F  87  16  134/88   92 L


 


 11/30/19 03:40  98.7 F  76  16  137/85   94 L


 


 11/29/19 23:29  97.9 F  65  12   124/58 L  93 L








 Weight











Admit Weight                   159 lb 4.8 oz


 


Weight                         159 lb 4.8 oz











 Most Recent Monitor Data











Heart Rate from ECG            84


 


NIBP                           121/74


 


NIBP BP-Mean                   89


 


Respiration from ECG           17


 


SpO2                           100














I&O: 


 











 11/29/19 11/30/19 12/01/19





 06:59 06:59 06:59


 


Intake Total 2150 1280 


 


Output Total 2000 1390 


 


Balance 150 -110 











Result Diagrams: 


 11/30/19 01:35





 11/30/19 01:35





Hospitalist ROS





- Medication


Medications: 


Active Medications











Generic Name Dose Route Start Last Admin





  Trade Name Freq  PRN Reason Stop Dose Admin


 


Hydrocodone Bitart/Acetaminophen  1 tab  11/26/19 15:56  11/30/19 03:33





  [place] 5/325  PO   1 tab





  Q4H PRN   Administration





  Moderate Pain (4-6)   





     





     





     


 


Bisacodyl  10 mg  11/26/19 15:56  11/29/19 18:51





  Dulcolax  NV   10 mg





  DAILYPRN PRN   Administration





  Constipation   





     





     





     


 


Levofloxacin  750 mg  11/30/19 07:30  11/30/19 09:07





  Levaquin  PO   750 mg





  Q2D PREET   Administration





     





     





     





     


 


Melatonin  6 mg  11/27/19 22:24  11/29/19 21:49





  Melatonin  PO   6 mg





  HS PRN   Administration





  Insomnia   





     





     





     


 


Ondansetron HCl  4 mg  11/26/19 15:56  11/26/19 17:21





  Zofran  IVP   4 mg





  Q6H PRN   Administration





  Nausea/Vomiting   





     





     





     


 


Pantoprazole Sodium  40 mg  11/27/19 09:00  11/30/19 09:07





  Protonix  PO   40 mg





  DAILY PREET   Administration





     





     





     





     


 


Senna/Docusate Sodium  2 tab  11/26/19 15:56  11/29/19 16:44





  Senokot S  PO   2 tab





  BIDPRN PRN   Administration





  Constipation   





     





     





     














- Exam


General Appearance: awake alert


Eye: anicteric sclera


ENT: normocephalic atraumatic, moist mucosa


Neck: supple, symmetric, no JVD


Heart: RRR


Respiratory: no wheezes, no ronchi, normal chest expansion, no tachypnea


Gastrointestinal: soft, non-tender, non-distended, normal bowel sounds


Gastrointestinal - other findings: Left nephrostomy tibe noted


Extremities - other findings: trace to mild bilateral leg edema


Neurological: cranial nerve grossly intact, no focal deficits


Psychiatric: normal affect, A&O x 3





Hosp A/P


(1) Acute on chronic anemia


Code(s): D64.9 - ANEMIA, UNSPECIFIED   Status: Acute   





(2) Severe sepsis


Code(s): A41.9 - SEPSIS, UNSPECIFIED ORGANISM; R65.20 - SEVERE SEPSIS WITHOUT 

SEPTIC SHOCK   Status: Acute   





(3) Neutropenic fever


Code(s): D70.9 - NEUTROPENIA, UNSPECIFIED; R50.81 - FEVER PRESENTING WITH 

CONDITIONS CLASSIFIED ELSEWHERE   Status: Acute   





(4) Pancytopenia


Code(s): D61.818 - OTHER PANCYTOPENIA   Status: Acute   





(5) AMRIT (acute kidney injury)


Code(s): N17.9 - ACUTE KIDNEY FAILURE, UNSPECIFIED   Status: Acute   





(6) CKD (chronic kidney disease) stage 4, GFR 15-29 ml/min


Code(s): N18.4 - CHRONIC KIDNEY DISEASE, STAGE 4 (SEVERE)   Status: Acute   





(7) Hydronephrosis


Code(s): N13.30 - UNSPECIFIED HYDRONEPHROSIS   Status: Acute   





(8) Moderate protein-calorie malnutrition


Code(s): E44.0 - MODERATE PROTEIN-CALORIE MALNUTRITION   Status: Acute   





(9) Prostate CA


Code(s): C61 - MALIGNANT NEOPLASM OF PROSTATE   Status: Acute   





(10) Bladder cancer


Status: Acute   





(11) Pyelonephritis


Code(s): N12 - TUBULO-INTERSTITIAL NEPHRITIS, NOT SPCF AS ACUTE OR CHRONIC   

Status: Acute   





(12) Metabolic acidosis


Code(s): E87.2 - ACIDOSIS   Status: Acute   





(13) MSSA (methicillin susceptible Staphylococcus aureus) infection


Code(s): A49.01 - METHICILLIN SUSCEP STAPH INFECTION, UNSP SITE   Status: Acute

   





- Plan


DC IV antibiotics. Start oral levaquin in line with susceptibility


DC IVF


transfuse 2 PRBC as per hematology


Pancytopenia treatment as per hematology.


Follow CBC and renal function


Continue oral supplementation.


increase activity and oral intake.

## 2019-11-30 NOTE — PDOC.MOPN
Interval History: 





Feeling better and wishes to go home. Still with intermittent left flank pain 

and tenderness.





- Vital Signs


Vital Signs: 


 Vital Signs (12 hours)











  Temp Pulse Pulse Resp BP BP Pulse Ox


 


 11/30/19 11:51  98.7 F   81  16  122/64   97


 


 11/30/19 11:26  97.9 F    16  110/59 L   97


 


 11/30/19 08:00  98.6 F  87   16   134/88  92 L


 


 11/30/19 03:40  98.7 F  76   16   137/85  94 L








 Weight











Admit Weight                   159 lb 4.8 oz


 


Weight                         159 lb 4.8 oz











 Most Recent Monitor Data











Heart Rate from ECG            84


 


NIBP                           121/74


 


NIBP BP-Mean                   89


 


Respiration from ECG           17


 


SpO2                           100

















- Physical Exam


General: Alert, Oriented x3, Cooperative


HEENT: Atraumatic


Lungs: Normal air movement


Cardiovascular: Regular rate


Psych/Mental Status: Mood NL





- Labs


Result Diagrams: 


 11/30/19 01:35





 11/30/19 01:35


Lab results: 


 Laboratory Results - last 24 hr





11/30/19 10:00: Blood Type O NEGATIVE, Antibody Screen NEGATIVE, Crossmatch See 

Detail


11/30/19 01:35: WBC 8.7, RBC 2.39 L, Hgb 6.6 L, Hct 19.3 L, MCV 80.9, MCH 27.7, 

MCHC 34.2, RDW 13.1, Plt Count 101 L, MPV 8.4, Neutrophils % (Manual) 52, Band 

Neuts % (Manual) 20 H, Lymphocytes % (Manual) 13 L, Monocytes % (Manual) 10, 

Metamyelocytes % (Man) 3 H, Myelocytes % 2 H, Plt Morphology Comment Appears 

Decreased L


11/30/19 01:35: Sodium 139, Potassium 3.5, Chloride 109 H, Carbon Dioxide 22 L, 

Anion Gap 12, BUN 52 H, Creatinine 2.18 H, Estimated GFR (MDRD) 29, Glucose 97, 

Calcium 7.9


11/26/19 17:36: Crossmatch See Detail











A/P





- Problem


(1) AMRIT (acute kidney injury)


Current Visit: Yes   Code(s): N17.9 - ACUTE KIDNEY FAILURE, UNSPECIFIED   Status

: Acute   





(2) Bladder cancer


Current Visit: Yes   Status: Acute   





(3) Neutropenic fever


Current Visit: Yes   Code(s): D70.9 - NEUTROPENIA, UNSPECIFIED; R50.81 - FEVER 

PRESENTING WITH CONDITIONS CLASSIFIED ELSEWHERE   Status: Acute   





- Plan


Plan: 





transfuse 2 units PRBC today


ok to DC home on oral antibiotics


f/u with me in clinic - will delay next chemo by 1 week and change Cisplatin to 

Carboplatin

## 2019-12-02 NOTE — PQF
LISA KELLER OBI, CHIZOBA C

[id number]                                                             

[id number]                             

                                   

CLINICAL DOCUMENTATION CLARIFICATION FORM:  POST DISCHARGE



Addendum to original discharge summary date:  __________________________________
____



Late entry note date:  _________________________________________________________
__











DATE: 12/2/19                                                  ATTN: Fausto Meek Obi



Please exercise your independent, professional judgment in responding to the 
clarification form. 

Clinical indicators are provided on the bottom of this form for your review



Please check appropriate box(s):

Pancytopenia due to:

   [  ] Chemotherapy/antineoplastic drugs

   [  ] Other drug-induced (please specify if known): _______________________

   [  ] Malignancy

[ X ] Other diagnosis ___________Pancytopenia: From Chemotherapy with possible 
contribution from sepsis

[  ] Unable to determine



In addition, please specify:

Present on Admission (POA):  [ x ] Yes             [  ] No             [  ] 
Unable to determine



For continuity of documentation, please document condition throughout progress 
notes and discharge summary.  Thank You.



CLINICAL INDICATORS - SIGNS / SYMPTOMS / LABS

Laboratory Hematology 11/26 WBC 0.4, Hgb 4.4, Hct 12.3, Platelet count 23

H&P p1 11/26 Dr Martinez Impression of neutropenic fever was made and the patient 
was treated with 2.5 of NS

PN p1 11/28 Anemia secondary to chemo combined with anemia of chronic disease

Hospitalist PN p4 11/28 Found to have severe sepsis and severe pancytopenia







RISK FACTORS

H&P p1 11/26  79-year old Male

H&P p3 11/26  Severe Sepsis

H&P p3 11/26  Severe neutropenia with absolute neutrophil count less than 600 
and associated with fever

H&P p1 11/26  Hx of bladder and prostate CA





TREATMENT:

Blood bank 11/26  Transfused RBC

H&P p3 11/26  Will get blood culture, urine culture, urinalysis as well as 
Stat CBC, CMP

H&P p3 11/26  Started IV Vancomycin and Cefepime

Oncology consult 11/27  Jose A Jackson

Urology consult  11/28 -Carmelo Kirk











(This form is maintained as a part of the permanent medical record)

2015 [employer]  All Rights Reserved

[email or web address]    [not provided]

                                                              



 

MTDD

## 2019-12-02 NOTE — PQF
LISA KELLER OBI, CHIZOBA SANTANA

[id number]                                                             

[id number]                             

                                   

CLINICAL DOCUMENTATION CLARIFICATION FORM:  POST DISCHARGE



Addendum to original discharge summary date:  __________________________________
____



Late entry note date:  _________________________________________________________
__











DATE: 12/2/19                                           ATTN: Fausto Martinez



Please exercise your independent, professional judgment in responding to the 
clarification form. 

Clinical indicators are provided on the bottom of this form for your review



Please check appropriate box(s):

[  ] Severe Sepsis is a complication of Nephrostomy

[  ] Severe Sepsis is not a complication of Nephrostomy

[ x ] Other diagnosis ___________Severe Sepsis: Due to Pyelonephritis/
Complicated UTI. Nephrostomy and Severe neutropenia may have contributed

[  ] Unable to determine



In addition, please specify:

Present on Admission (POA):  [ x ] Yes             [  ] No             [  ] 
Unable to determine





CLINICAL INDICATORS - SIGNS / SYMPTOMS / LABS

H&P p1 11/26 Dr Martinez known hx of prostate CA s/p radiotherapy, bladder CA 
associated with hydronephrosis, s/p stent placement and subsequently left 
nephrostomy tube placement

H&P p1 11/26 Dr Martinez He has not been able to pass urine through the penis, with 
only urine drainage beling from the left nephrostomy tube

H&P p3 11/26  Presume right renal obstruction as the pt has not been able to 
pass urine







RISK FACTORS

H&P p1 11/26  79-year old Male

H&P p1 11/26 - s/p left Nephrostomy tube placement

H&P p1 11/26  Hx of bladder and prostate CA

H&P p3 11/26  Severe Sepsis

Hospitalist PN p4 11/28  Pyelonephritis







TREATMENT:

H&P p3 11/26  Will get blood culture, urine culture, urinalysis as well as 
Stat CBC, CMP

H&P p3 11/26  Started IV Vancomycin and Cefepime

Oncology consult 11/27  Jose A Jackson

Urology consult  11/28 -Carmelo Kirk











(This form is maintained as a part of the permanent medical record)

2015 [employer]  All Rights Reserved

[email or web address]    [not provided]

                                                              



 

MTDD

## 2019-12-02 NOTE — DIS
DATE OF ADMISSION:  11/26/2019



DATE OF DISCHARGE:  11/30/2019



DISCHARGE DIAGNOSES:  

1. Severe sepsis due to pyelonephritis and complicated urinary tract infection with

possible contribution from nephrostomy and neutropenia. 

2. Methicillin-susceptible Staphylococcus aureus urinary tract

infection/pyelonephritis. 

3. Neutropenic fever.

4. Pancytopenia.

5. Acute on chronic blood loss anemia status post blood transfusion.

6. Acute kidney injury.

7. Right-sided mild hydronephrosis.

8. Left-sided hydronephrosis status post stent placement and nephrostomy tube

placement. 

9. Chronic kidney disease stage 4.

10. Moderate protein-calorie malnutrition.

11. Prostate cancer status post surgery.

12. Bladder cancer, on chemotherapy.

13. Metabolic acidosis.

14. Physical deconditioning.



CONSULTS:  

1. Oncology.

2. Urology.



HOSPITAL COURSE:  A 79-year-old male with invasive bladder cancer complicated by

hydronephrosis status post left nephrostomy tube, who was recently started on

chemotherapy and admitted with fever associated with malaise and poor oral intake.

The patient was found to be hypotensive and impression of severe sepsis and severe

pancytopenia with neutropenic fever was made and he was started on broad-spectrum

antibiotic therapy as well as IV fluid therapy with improvement in both blood

pressure and general condition.  Further evaluation with CT scan of the abdomen

showed right hydronephrosis.  Given association with acute kidney injury, urology

consult was obtained for possible invasive intervention.  However, with creatinine

trending downward, it was felt that the acute kidney injury was due to prerenal

etiology and repeat ultrasound of the abdomen showed mild hydronephrosis on the left

side.  Urine culture grew MSSA and antibiotics __________ in line with

susceptibility.  The patient received blood transfusion during this hospitalization

and was also seen in consultation by the Hematology/Oncology, who started the

patient on Neulasta with improvement in counts.  The patient improved and was

subsequently discharged home to continue physical therapy. 



DISCHARGE CONDITION:  Improved.



DISCHARGE DISPOSITION:  Home.



DISCHARGE MEDICATIONS:  

1. Hydrocodone 5/325 one tablet q.6 p.r.n.

2. Aspirin 325 mg p.o. daily.

3. Esomeprazole 20 mg p.o. daily.

4. Advair 1 inhalation daily.

5. Multivitamin 1 capsule p.o. daily.

6. Levofloxacin 750 mg p.o. every other day for 5 doses.

7. Metoprolol succinate 25 mg p.o. daily. 



Discharge took more than 40 minutes.







Job ID:  [id number]

## 2019-12-22 ENCOUNTER — HOSPITAL ENCOUNTER (INPATIENT)
Dept: HOSPITAL 92 - ERS | Age: 79
LOS: 2 days | Discharge: HOME | DRG: 683 | End: 2019-12-24
Attending: INTERNAL MEDICINE | Admitting: INTERNAL MEDICINE
Payer: MEDICARE

## 2019-12-22 VITALS — BODY MASS INDEX: 22.4 KG/M2

## 2019-12-22 DIAGNOSIS — C67.9: ICD-10-CM

## 2019-12-22 DIAGNOSIS — I10: ICD-10-CM

## 2019-12-22 DIAGNOSIS — K21.9: ICD-10-CM

## 2019-12-22 DIAGNOSIS — D70.1: ICD-10-CM

## 2019-12-22 DIAGNOSIS — Z93.6: ICD-10-CM

## 2019-12-22 DIAGNOSIS — N17.9: Primary | ICD-10-CM

## 2019-12-22 DIAGNOSIS — I95.9: ICD-10-CM

## 2019-12-22 DIAGNOSIS — E87.5: ICD-10-CM

## 2019-12-22 DIAGNOSIS — T45.1X5A: ICD-10-CM

## 2019-12-22 DIAGNOSIS — E87.2: ICD-10-CM

## 2019-12-22 DIAGNOSIS — G89.29: ICD-10-CM

## 2019-12-22 DIAGNOSIS — D64.81: ICD-10-CM

## 2019-12-22 DIAGNOSIS — E86.0: ICD-10-CM

## 2019-12-22 LAB
ALBUMIN SERPL BCG-MCNC: 3.7 G/DL (ref 3.4–4.8)
ALP SERPL-CCNC: 77 U/L (ref 40–110)
ALT SERPL W P-5'-P-CCNC: 34 U/L (ref 8–55)
ANION GAP SERPL CALC-SCNC: 22 MMOL/L (ref 10–20)
AST SERPL-CCNC: 48 U/L (ref 5–34)
BACTERIA UR QL AUTO: (no result) HPF
BASOPHILS # BLD AUTO: 0 THOU/UL (ref 0–0.2)
BASOPHILS NFR BLD AUTO: 0.4 % (ref 0–1)
BICARBONATE (HCO3V): 21 MMOL/L (ref 22–28)
BILIRUB SERPL-MCNC: 0.6 MG/DL (ref 0.2–1.2)
BUN SERPL-MCNC: 72 MG/DL (ref 8.4–25.7)
CA-I BLD-SCNC: 1 MMOL/L
CALCIUM SERPL-MCNC: 9.3 MG/DL (ref 7.8–10.44)
CHLORIDE SERPL-SCNC: 103 MMOL/L (ref 98–107)
CHLORIDE SERPL-SCNC: 104 MMOL/L (ref 98–107)
CO2 BLDV CALC-SCNC: 22 MMOL/L (ref 22–28)
CO2 SERPL-SCNC: 17 MMOL/L (ref 23–31)
CO2 TENSION (PVCO2): 33.4 MMHG (ref 40–50)
CREAT CL PREDICTED SERPL C-G-VRATE: 0 ML/MIN (ref 70–130)
EOSINOPHIL # BLD AUTO: 0 THOU/UL (ref 0–0.7)
EOSINOPHIL NFR BLD AUTO: 0.3 % (ref 0–10)
GLOBULIN SER CALC-MCNC: 4.9 G/DL (ref 2.4–3.5)
GLUCOSE SERPL-MCNC: 134 MG/DL (ref 83–110)
HCT VFR BLD CALC: 18 % (ref 42–52)
HEMOGLOBIN - CALC: 6.1 G/DL (ref 14–18)
HGB BLD-MCNC: 8 G/DL (ref 14–18)
LEUKOCYTE ESTERASE UR QL STRIP.AUTO: 500 LEU/UL
LYMPHOCYTES # BLD: 1.5 THOU/UL (ref 1.2–3.4)
LYMPHOCYTES NFR BLD AUTO: 24.8 % (ref 21–51)
MCH RBC QN AUTO: 28.4 PG (ref 27–31)
MCV RBC AUTO: 81.5 FL (ref 78–98)
MONOCYTES # BLD AUTO: 0.7 THOU/UL (ref 0.11–0.59)
MONOCYTES NFR BLD AUTO: 10.9 % (ref 0–10)
NEUTROPHILS # BLD AUTO: 3.9 THOU/UL (ref 1.4–6.5)
NEUTROPHILS NFR BLD AUTO: 63.7 % (ref 42–75)
PLATELET # BLD AUTO: 114 THOU/UL (ref 130–400)
POTASSIUM SERPL-SCNC: 5.5 MMOL/L (ref 3.5–5.1)
POTASSIUM SERPL-SCNC: 5.8 MMOL/L (ref 3.5–5.1)
PROT UR STRIP.AUTO-MCNC: 300 MG/DL
RBC # BLD AUTO: 2.83 MILL/UL (ref 4.7–6.1)
SAO2 % BLDV FROM PO2: 98.3 % (ref 60–85)
SODIUM SERPL-SCNC: 135 MMOL/L (ref 138–145)
SODIUM SERPL-SCNC: 136 MMOL/L (ref 136–145)
WBC # BLD AUTO: 6 THOU/UL (ref 4.8–10.8)

## 2019-12-22 PROCEDURE — 96361 HYDRATE IV INFUSION ADD-ON: CPT

## 2019-12-22 PROCEDURE — 87804 INFLUENZA ASSAY W/OPTIC: CPT

## 2019-12-22 PROCEDURE — 83605 ASSAY OF LACTIC ACID: CPT

## 2019-12-22 PROCEDURE — 87040 BLOOD CULTURE FOR BACTERIA: CPT

## 2019-12-22 PROCEDURE — 93005 ELECTROCARDIOGRAM TRACING: CPT

## 2019-12-22 PROCEDURE — 36415 COLL VENOUS BLD VENIPUNCTURE: CPT

## 2019-12-22 PROCEDURE — 71045 X-RAY EXAM CHEST 1 VIEW: CPT

## 2019-12-22 PROCEDURE — 87086 URINE CULTURE/COLONY COUNT: CPT

## 2019-12-22 PROCEDURE — 86850 RBC ANTIBODY SCREEN: CPT

## 2019-12-22 PROCEDURE — 85025 COMPLETE CBC W/AUTO DIFF WBC: CPT

## 2019-12-22 PROCEDURE — 81015 MICROSCOPIC EXAM OF URINE: CPT

## 2019-12-22 PROCEDURE — 86901 BLOOD TYPING SEROLOGIC RH(D): CPT

## 2019-12-22 PROCEDURE — 80048 BASIC METABOLIC PNL TOTAL CA: CPT

## 2019-12-22 PROCEDURE — 96360 HYDRATION IV INFUSION INIT: CPT

## 2019-12-22 PROCEDURE — 81003 URINALYSIS AUTO W/O SCOPE: CPT

## 2019-12-22 PROCEDURE — 82803 BLOOD GASES ANY COMBINATION: CPT

## 2019-12-22 PROCEDURE — 36416 COLLJ CAPILLARY BLOOD SPEC: CPT

## 2019-12-22 PROCEDURE — 80053 COMPREHEN METABOLIC PANEL: CPT

## 2019-12-22 PROCEDURE — 86900 BLOOD TYPING SEROLOGIC ABO: CPT

## 2019-12-22 PROCEDURE — 36430 TRANSFUSION BLD/BLD COMPNT: CPT

## 2019-12-22 PROCEDURE — 82330 ASSAY OF CALCIUM: CPT

## 2019-12-22 PROCEDURE — P9016 RBC LEUKOCYTES REDUCED: HCPCS

## 2019-12-22 NOTE — RAD
PORTABLE CHEST:

 

History: Hypertension. Bladder cancer. Fever. 

 

Comparison: 11-26-19

 

FINDINGS: 

Heart size is within normal limits. There are atherosclerotic changes of the aorta. The lungs are xochitl
ar of infiltrates. Right sided Mediport catheter is present. 

 

IMPRESSION: 

No active intrathoracic disease. 

 

POS: [facility]

## 2019-12-23 LAB
ALBUMIN SERPL BCG-MCNC: 3 G/DL (ref 3.4–4.8)
ALP SERPL-CCNC: 59 U/L (ref 40–110)
ALT SERPL W P-5'-P-CCNC: 27 U/L (ref 8–55)
ANION GAP SERPL CALC-SCNC: 16 MMOL/L (ref 10–20)
ANION GAP SERPL CALC-SCNC: 16 MMOL/L (ref 10–20)
AST SERPL-CCNC: 37 U/L (ref 5–34)
BASOPHILS # BLD AUTO: 0 THOU/UL (ref 0–0.2)
BASOPHILS NFR BLD AUTO: 0 % (ref 0–1)
BILIRUB SERPL-MCNC: 0.4 MG/DL (ref 0.2–1.2)
BUN SERPL-MCNC: 59 MG/DL (ref 8.4–25.7)
BUN SERPL-MCNC: 67 MG/DL (ref 8.4–25.7)
CALCIUM SERPL-MCNC: 8.4 MG/DL (ref 7.8–10.44)
CALCIUM SERPL-MCNC: 8.5 MG/DL (ref 7.8–10.44)
CHLORIDE SERPL-SCNC: 108 MMOL/L (ref 98–107)
CHLORIDE SERPL-SCNC: 110 MMOL/L (ref 98–107)
CO2 SERPL-SCNC: 19 MMOL/L (ref 23–31)
CO2 SERPL-SCNC: 19 MMOL/L (ref 23–31)
CREAT CL PREDICTED SERPL C-G-VRATE: 15 ML/MIN (ref 70–130)
CREAT CL PREDICTED SERPL C-G-VRATE: 17 ML/MIN (ref 70–130)
EOSINOPHIL # BLD AUTO: 0 THOU/UL (ref 0–0.7)
EOSINOPHIL NFR BLD AUTO: 0.2 % (ref 0–10)
GLOBULIN SER CALC-MCNC: 3.9 G/DL (ref 2.4–3.5)
GLUCOSE SERPL-MCNC: 125 MG/DL (ref 83–110)
GLUCOSE SERPL-MCNC: 157 MG/DL (ref 83–110)
HGB BLD-MCNC: 6.2 G/DL (ref 14–18)
HGB BLD-MCNC: 9.3 G/DL (ref 14–18)
LYMPHOCYTES # BLD: 0.8 THOU/UL (ref 1.2–3.4)
LYMPHOCYTES NFR BLD AUTO: 22.9 % (ref 21–51)
MCH RBC QN AUTO: 28.7 PG (ref 27–31)
MCH RBC QN AUTO: 29.2 PG (ref 27–31)
MCV RBC AUTO: 81.4 FL (ref 78–98)
MCV RBC AUTO: 83.8 FL (ref 78–98)
MDIFF COMPLETE?: YES
MONOCYTES # BLD AUTO: 0.5 THOU/UL (ref 0.11–0.59)
MONOCYTES NFR BLD AUTO: 13.7 % (ref 0–10)
NEUTROPHILS # BLD AUTO: 2.2 THOU/UL (ref 1.4–6.5)
NEUTROPHILS NFR BLD AUTO: 63.3 % (ref 42–75)
PLATELET # BLD AUTO: 72 THOU/UL (ref 130–400)
PLATELET # BLD AUTO: 81 THOU/UL (ref 130–400)
POTASSIUM SERPL-SCNC: 4.7 MMOL/L (ref 3.5–5.1)
POTASSIUM SERPL-SCNC: 5.3 MMOL/L (ref 3.5–5.1)
RBC # BLD AUTO: 2.17 MILL/UL (ref 4.7–6.1)
RBC # BLD AUTO: 3.17 MILL/UL (ref 4.7–6.1)
SODIUM SERPL-SCNC: 138 MMOL/L (ref 136–145)
SODIUM SERPL-SCNC: 140 MMOL/L (ref 136–145)
WBC # BLD AUTO: 3.5 THOU/UL (ref 4.8–10.8)
WBC # BLD AUTO: 4.6 THOU/UL (ref 4.8–10.8)

## 2019-12-23 PROCEDURE — 30243N1 TRANSFUSION OF NONAUTOLOGOUS RED BLOOD CELLS INTO CENTRAL VEIN, PERCUTANEOUS APPROACH: ICD-10-PCS | Performed by: INTERNAL MEDICINE

## 2019-12-23 NOTE — HP
PRIMARY CARE PHYSICIAN:  Dr. Dennys Esquivel.



CHIEF COMPLAINT:  Hypertension.



HISTORY OF PRESENT ILLNESS:  Mr. Mckeon is a 79-year-old man who reported to the

emergency room today for high blood pressure.  Wife reports that his blood pressure

was taken at home with a wrist cuff.  Reports that it read high.  Reports that he is

currently on chemotherapy for bladder cancer, has a nephrostomy tube.  He denies any

chest pain, shortness of breath, fever, chills, abdominal pain, or headache.  He has

some rhinorrhea, but denies any other symptoms.  He has a history of chronic back

pain.  When he got to the emergency room, he was a little hypotensive.  Blood

pressure 81/50, pulse is 92, respirations are 18, and temp is 94.5.  He was given 1

L of normal saline and his vital signs improved.  Labs pertinent for white blood

cell count of 6, hemoglobin 8, hematocrit is 23, and platelet count is 114.

Chemistry; sodium 136, potassium 5.5, chloride 103, gap is 22, and creatinine is

3.98.  This is significantly worse than it was on 12/10/2019 when it was 2.58.

Glucose 134, lactic acid 3.7, AST 48, and ALT 34.  Urine from the nephrostomy bag

with protein, 2+ blood, leukocyte esterase 500, white blood cell count greater than

50, and 1+ bacteria.  This has been sent off for culture.  They have also sent off

blood cultures. 

Patient will be admitted to observation for further management.



REVIEW OF SYSTEMS:  Patient reports hypertension.  Reports not drinking fluids like

he needs to.  He reports some rhinorrhea.  Denies any sore throat, fever, or chills.

 He denies any abdominal pain.  He reports chronic back pain, but no new injury or

trauma.  Reports some neck weakness. 



All systems are reviewed and are negative unless mentioned in the HPI.



PAST MEDICAL HISTORY:  Hypertension, malignancy __________ prostate, and has a

nephrostomy tube. 



PAST SURGICAL HISTORY:  Hernia repair.  Urology stent.  Also, nephrostomy tube.



PSYCHIATRIC HISTORY:  None.



SOCIAL HISTORY:  No alcohol, drug, or smoking history.



ALLERGIES:  NONE.



MEDICATIONS:  Which still need to be verified.

1. Aspirin 325 mg p.o. b.i.d.

2. Prilosec 20 mg p.o. daily.

3. Advair Diskus __________ one inhalation daily.

4. Hydrocodone 5/325 q.6 as needed.

5. Multivitamin one tablet p.o. daily.

6. Metoprolol succinate 25 mg p.o. daily.



PHYSICAL EXAMINATION:

VITAL SIGNS:  Blood pressure 128/68, pulse is 80, respirations 16, temperature is

98.2, and pO2 sats are 100% on room air. 

CONSTITUTIONAL:  Patient is alert and oriented.  Looks nontoxic. 

HEENT:  Head is atraumatic and normocephalic.  Eyes, pupils are equal, round, and

reactive to light.  Extraocular muscles are intact.  ENT; mucous membranes are dry.

Mouth exam is normal. 

NECK:  Normal range of motion.  No tenderness. 

RESPIRATORY/CHEST:  Breath sounds are clear.  Chest expansion is equal. 

CARDIOVASCULAR:  Regular rate and rhythm.  Heart sounds are normal. 

ABDOMEN:  Nontender.  Has a left nephrostomy tube. 

BACK:  Normal range of motion.  No tenderness. 

EXTREMITIES:  Upper extremity, normal range of motion, normal strength, radial

pulses are normal.  Does have some ecchymosis to bilateral upper arms.  Lower

extremity, normal range of motion.  Motor strength is normal.  No edema.  Pedal

pulses are normal. 

NEUROLOGIC:  Patient is oriented to person, place, and time. 

SKIN:  Warm, dry, and pale.



DATA REVIEWED:  12-lead in the emergency room shows a first-degree AV block.  [place]

is left. 



PLAN/ASSESSMENT:  

1. Hyperkalemia, dehydration.  1 L fluid is given in the emergency room.  We will

continue this gentle hydration at 75 mL per hour.  We will order one dose of the

Kayexalate.  Recheck a basic metabolic panel in the a.m.  Urine culture, blood

culture have been ordered. 

2. Acute on chronic renal.  Gentle hydration.  See #1.  We will also ask Nephrology

to consult.  Recheck lab values. 

3. Possible urinary tract infection.  This is sent off for culture.  However, wife

reports that the sample was taken from the bag and not from the tube.  Tube has been

in place for a couple of weeks.  We have ordered to recheck the urine from the tube

and not the bag.  Currently, we are going to hold off antibiotics until preliminary

culture returns. 

4. Prostate, bladder cancer, on chemotherapy.  If the patient is going to be here

for extended period time, we will ask Dr. Herron to consult. 

5. History of hypertension.  We will monitor his vital signs.  Restart home

medications. 

6. Gastrointestinal and deep venous thrombosis prophylaxis has been started. 



Case discussed with Dr. Dueñas, who agrees with the plan.







Job ID:  [id number]

## 2019-12-23 NOTE — PDOC.EVN
Event Note





- Event Note


Event Note: 





The patient feels much better and wants to go home. He states that he is 

getting chemotherapy for bladder cancer, last chemo was two weeks ago, he was 

due for chemo today. He denies dizziness or lightheadedness. He says he feels 

fine.  He had just finished first unit PRBC on my evaluation





Patient explained if creatinine back to baseline, then he could go home, labs 

were pending








General: patient alert, awake, oriented times three


CV: RRR, no murmurs, rubs, gallops


Lungs: CTAB


Abdomen: + BS, soft, nontender, nondistended


Extremities: no edema


: ontiveros catheter in place. Patient has had it for six months








Labs: 


hemoglobin up to 9


creatinine improved but still high


Lactate 2.4





This is a 79 year old male with bladder cancer with ontiveros catheter who 

presented to ER with high blood pressure, but incidentally was actually found 

to be hypotensive in the ER with AMRIT and lactic acidosis








AMRIT


- improving, but not back to baseline yet. Continue IV fluids








Lactic acidosis


- went from 3.5 to 2.4, continue IV fluids 





Anemia


- Hb 6, s/p 2 units PRBC with improvement to 9


- likely from chemotherapy








Hypertension


- d/c lasix for now














Dispo: will change to inpt given continued need for IV fluids, possibly d/c 

tomorrow

## 2019-12-24 VITALS — TEMPERATURE: 97.3 F | DIASTOLIC BLOOD PRESSURE: 80 MMHG | SYSTOLIC BLOOD PRESSURE: 174 MMHG

## 2019-12-24 LAB
ANION GAP SERPL CALC-SCNC: 17 MMOL/L (ref 10–20)
BUN SERPL-MCNC: 51 MG/DL (ref 8.4–25.7)
CALCIUM SERPL-MCNC: 8.4 MG/DL (ref 7.8–10.44)
CHLORIDE SERPL-SCNC: 108 MMOL/L (ref 98–107)
CO2 SERPL-SCNC: 18 MMOL/L (ref 23–31)
CREAT CL PREDICTED SERPL C-G-VRATE: 19 ML/MIN (ref 70–130)
CREAT CL PREDICTED SERPL C-G-VRATE: 21 ML/MIN (ref 70–130)
GLUCOSE SERPL-MCNC: 99 MG/DL (ref 83–110)
POTASSIUM SERPL-SCNC: 4.6 MMOL/L (ref 3.5–5.1)
SODIUM SERPL-SCNC: 138 MMOL/L (ref 136–145)

## 2019-12-24 NOTE — EKG
Test Reason : 

Blood Pressure : ***/*** mmHG

Vent. Rate : 068 BPM     Atrial Rate : 068 BPM

   P-R Int : 312 ms          QRS Dur : 080 ms

    QT Int : 404 ms       P-R-T Axes : 044 -25 025 degrees

   QTc Int : 429 ms

 

Sinus rhythm with 1st degree A-V block

Low voltage QRS

Nonspecific ST abnormality

Abnormal ECG

 

Confirmed by CELENA ONEILL, ROVERTO ([id number]),  DOMENICA HERNANDEZ ([id number]) on 12/24/2019 2:25:13 PM

 

Referred By:             Confirmed By:ROVERTO DALLAS MD

## 2019-12-24 NOTE — DIS
DATE OF ADMISSION:  12/23/2019



DATE OF DISCHARGE:  12/24/2019



DISCHARGE DIAGNOSES:   Acute Kidney Injury,  Hypotension, Lactic acidosis,  
Pancytopenia secondary to chemotherapy requiring blood transfusio

SECONDARY DISCHARGE DIAGNOSES:  Prostate cancer s/p nephrostomy tube



CONSULTATIONS:  None.



PROCEDURES:  None.





BRIEF HISTORY OF PRESENT ILLNESS:  This is a 79-year-old male with a past 
medical

history of GERD, BPH, prostate cancer with chronic Del Angel catheter, who had 
presented

to the emergency room when his family member noticed that his blood pressure was

high.  They stated that they checked it on themselves and it was still high, so 
they

felt like the blood pressure cuff was accurate.  However, when patient came to 
the

ER, he was actually hypotensive with a blood pressure of 81/50.  Labs are 
pertinent

for a lactic acid of 3.7 and a creatinine of 3.98.  The patient was admitted for

sepsis workup and IV fluid hydration. 



HOSPITAL COURSE: ]

 Acute kidney injury/hypotension/lactic acidosis:  The patient

initially presented with a creatinine of 3.98.  The patient was given IV fluids 
and

his creatinine came down to 2.64, which was noted to be close to his baseline.

Repeat lactic acid came down to 1.7 on the day of discharge.  The patient had a

sepsis workup including 2 sets of blood cultures, which were negative, negative 
flu

panel, negative urine culture, negative chest x-ray.  The patient was noted to 
be on

an excessive amount of blood pressure medications.  His lisinopril, metoprolol, 
and

triamterene/hydrochlorothiazide were discontinued on discharge.  Since the 
patient's

blood pressure went up to the 170s in the afternoon, he was resumed on his

felodipine.  The patient should follow up with his PCP in a week and have his 
blood

pressure rechecked to make sure that it is stable and he does not need 
resumption of

his blood pressure medications. 



Anemia:  The patient's hemoglobin had dropped from 8.0 to 6.2.  The patient

underwent 2 units of blood PRBC transfusion and his hemoglobin came up to 9.3.  
This

is likely secondary to his chemotherapy.  The patient should have a repeat CBC 
done

as an outpatient. 



Prostate cancer with nephrostomy tube:  The patient states he got his last

chemotherapy 2 weeks ago and is due for chemotherapy on the 23rd.  He will 
follow up

with oncologist in a week for further chemotherapy. 



Leukopenia:  The patient had a white blood cell count of 3.5 on the 23rd, which

improved to 4.6.  This most likely secondary to his chemotherapy.  The patient 
did

not spike any fevers and sepsis workup was negative.  This can be followed up 
as an

outpatient. 



DISCHARGE PHYSICAL EXAMINATION: 

 VITAL SIGNS:  Temperature 97.3, heart rate 78,

respiratory rate 18, O2 saturation 99% on room air, blood pressure 174/80. 

GENERAL:  The patient is alert, awake, oriented x3. 

CVS:  Regular rate and rhythm with no murmurs, rubs, or gallops. 

LUNGS:  Clear to auscultation bilaterally. 

ABDOMEN:  The patient has a nephrostomy tube in place, which is draining clear

yellow urine.  His abdomen is soft, nontender, nondistended. 

EXTREMITIES:  No rashes or edema.



PERTINENT LABORATORY DATA: 

 CBC on 12/23:  White count 4.6, hemoglobin 9.3,

hematocrit 26.6, platelet count 81. 

BMP on 12/24:  bicarb of 18, creatinine of 2.64. 

Lactic acid: was 3.7 on the 22nd, which downtrended to 1.7 on the 24th. 

UA on 12/22:  turbid urine with 300 protein, 2+ blood, 500 leukocyte esterase,

greater than 50 RBC, greater than 50 white blood cells, 1+ bacteria. 

Blood cultures on 12/22:  Shows no growth. 

Influenza panel on 12/22 : negative. 

Urine culture on 12/22 : no growth.



PERTINENT IMAGING:  

Chest x-ray on 12/22 : shows no acute disease.



DISCHARGE CONDITION:  Stable.



ACTIVITY:  As tolerated.



DIET:  Heart healthy diet.



DISPOSITION:  Home.



DISCHARGE MEDICATIONS:  

1. Hydrocodone 5 mg/325 mg one tablet p.o. q.6 hours p.r.n.

2. Aspirin 81 mg p.o. daily.

3. Esomeprazole 20 mg p.o. daily.

4. Felodipine 5 mg p.o. daily.

5. Advair 1 inhalation each daily.

6. Multivitamin 1 capsule p.o. daily.

7. Ropinirole 0.5 mg p.o. at bedtime.

8. Tamsulosin 0.4 mg p.o. b.i.d.

9. Tramadol 50 mg p.o. at bedtime.



DISCHARGE INSTRUCTIONS:  The patient was advised to discontinue his metoprolol,

triamterene, hydrochlorothiazide, and lisinopril.  He was resumed on his 
felodipine.

 The patient should follow up with his PCP in a week to have his blood pressure

rechecked.  He should also have repeat labs done to assess his pancytopenia.  He

should follow up with his oncologist within a week to 2 weeks. 







Job ID:  [id number]



MTDD

## 2020-01-07 ENCOUNTER — HOSPITAL ENCOUNTER (OUTPATIENT)
Dept: HOSPITAL 92 - ONC/OP | Age: 80
Discharge: HOME | End: 2020-01-07
Attending: INTERNAL MEDICINE
Payer: MEDICARE

## 2020-01-07 VITALS — TEMPERATURE: 97.5 F | DIASTOLIC BLOOD PRESSURE: 73 MMHG | SYSTOLIC BLOOD PRESSURE: 170 MMHG

## 2020-01-07 DIAGNOSIS — D64.9: Primary | ICD-10-CM

## 2020-01-07 PROCEDURE — 36430 TRANSFUSION BLD/BLD COMPNT: CPT

## 2020-01-07 PROCEDURE — 80053 COMPREHEN METABOLIC PANEL: CPT

## 2020-01-07 PROCEDURE — P9016 RBC LEUKOCYTES REDUCED: HCPCS

## 2020-01-07 PROCEDURE — 84550 ASSAY OF BLOOD/URIC ACID: CPT

## 2020-01-07 PROCEDURE — 82248 BILIRUBIN DIRECT: CPT

## 2020-01-07 PROCEDURE — 30233N1 TRANSFUSION OF NONAUTOLOGOUS RED BLOOD CELLS INTO PERIPHERAL VEIN, PERCUTANEOUS APPROACH: ICD-10-PCS | Performed by: INTERNAL MEDICINE

## 2020-01-07 PROCEDURE — 86900 BLOOD TYPING SEROLOGIC ABO: CPT

## 2020-01-07 PROCEDURE — 84100 ASSAY OF PHOSPHORUS: CPT

## 2020-01-07 PROCEDURE — 30233R1 TRANSFUSION OF NONAUTOLOGOUS PLATELETS INTO PERIPHERAL VEIN, PERCUTANEOUS APPROACH: ICD-10-PCS | Performed by: INTERNAL MEDICINE

## 2020-01-07 PROCEDURE — 86901 BLOOD TYPING SEROLOGIC RH(D): CPT

## 2020-01-07 PROCEDURE — 86850 RBC ANTIBODY SCREEN: CPT

## 2020-01-07 PROCEDURE — 83615 LACTATE (LD) (LDH) ENZYME: CPT

## 2020-01-14 ENCOUNTER — HOSPITAL ENCOUNTER (OUTPATIENT)
Dept: HOSPITAL 92 - ONC/OP | Age: 80
Discharge: HOME | End: 2020-01-14
Attending: INTERNAL MEDICINE
Payer: MEDICARE

## 2020-01-14 VITALS — DIASTOLIC BLOOD PRESSURE: 71 MMHG | SYSTOLIC BLOOD PRESSURE: 162 MMHG

## 2020-01-14 VITALS — TEMPERATURE: 97.5 F

## 2020-01-14 DIAGNOSIS — D69.6: ICD-10-CM

## 2020-01-14 DIAGNOSIS — D64.9: Primary | ICD-10-CM

## 2020-01-14 LAB — HGB BLD-MCNC: 6.4 G/DL (ref 14–18)

## 2020-01-14 PROCEDURE — 86900 BLOOD TYPING SEROLOGIC ABO: CPT

## 2020-01-14 PROCEDURE — 30233N1 TRANSFUSION OF NONAUTOLOGOUS RED BLOOD CELLS INTO PERIPHERAL VEIN, PERCUTANEOUS APPROACH: ICD-10-PCS | Performed by: INTERNAL MEDICINE

## 2020-01-14 PROCEDURE — 85018 HEMOGLOBIN: CPT

## 2020-01-14 PROCEDURE — 82565 ASSAY OF CREATININE: CPT

## 2020-01-14 PROCEDURE — 86901 BLOOD TYPING SEROLOGIC RH(D): CPT

## 2020-01-14 PROCEDURE — 85014 HEMATOCRIT: CPT

## 2020-01-14 PROCEDURE — P9016 RBC LEUKOCYTES REDUCED: HCPCS

## 2020-01-14 PROCEDURE — 30233R1 TRANSFUSION OF NONAUTOLOGOUS PLATELETS INTO PERIPHERAL VEIN, PERCUTANEOUS APPROACH: ICD-10-PCS | Performed by: INTERNAL MEDICINE

## 2020-01-14 PROCEDURE — 86850 RBC ANTIBODY SCREEN: CPT

## 2020-01-14 PROCEDURE — 36430 TRANSFUSION BLD/BLD COMPNT: CPT

## 2020-01-14 PROCEDURE — P9035 PLATELET PHERES LEUKOREDUCED: HCPCS

## 2020-01-16 ENCOUNTER — HOSPITAL ENCOUNTER (OUTPATIENT)
Dept: HOSPITAL 92 - 2SW | Age: 80
Discharge: HOME | End: 2020-01-16
Attending: INTERNAL MEDICINE
Payer: MEDICARE

## 2020-01-16 VITALS — DIASTOLIC BLOOD PRESSURE: 60 MMHG | TEMPERATURE: 98.5 F | SYSTOLIC BLOOD PRESSURE: 127 MMHG

## 2020-01-16 VITALS — BODY MASS INDEX: 21.9 KG/M2

## 2020-01-16 DIAGNOSIS — D69.6: ICD-10-CM

## 2020-01-16 DIAGNOSIS — D64.9: Primary | ICD-10-CM

## 2020-01-16 PROCEDURE — 86850 RBC ANTIBODY SCREEN: CPT

## 2020-01-16 PROCEDURE — 36415 COLL VENOUS BLD VENIPUNCTURE: CPT

## 2020-01-16 PROCEDURE — 86900 BLOOD TYPING SEROLOGIC ABO: CPT

## 2020-01-16 PROCEDURE — 36430 TRANSFUSION BLD/BLD COMPNT: CPT

## 2020-01-16 PROCEDURE — 30233R1 TRANSFUSION OF NONAUTOLOGOUS PLATELETS INTO PERIPHERAL VEIN, PERCUTANEOUS APPROACH: ICD-10-PCS | Performed by: INTERNAL MEDICINE

## 2020-01-16 PROCEDURE — P9016 RBC LEUKOCYTES REDUCED: HCPCS

## 2020-01-16 PROCEDURE — 30233N1 TRANSFUSION OF NONAUTOLOGOUS RED BLOOD CELLS INTO PERIPHERAL VEIN, PERCUTANEOUS APPROACH: ICD-10-PCS | Performed by: INTERNAL MEDICINE

## 2020-01-16 PROCEDURE — 82565 ASSAY OF CREATININE: CPT

## 2020-01-16 PROCEDURE — 86901 BLOOD TYPING SEROLOGIC RH(D): CPT

## 2020-02-05 ENCOUNTER — HOSPITAL ENCOUNTER (OUTPATIENT)
Dept: HOSPITAL 92 - ONC/OP | Age: 80
Discharge: HOME | End: 2020-02-05
Attending: INTERNAL MEDICINE
Payer: MEDICARE

## 2020-02-05 ENCOUNTER — HOSPITAL ENCOUNTER (EMERGENCY)
Dept: HOSPITAL 92 - ERS | Age: 80
Discharge: HOME | End: 2020-02-05
Payer: MEDICARE

## 2020-02-05 VITALS — DIASTOLIC BLOOD PRESSURE: 92 MMHG | SYSTOLIC BLOOD PRESSURE: 197 MMHG

## 2020-02-05 VITALS — TEMPERATURE: 98.2 F

## 2020-02-05 DIAGNOSIS — R07.89: Primary | ICD-10-CM

## 2020-02-05 DIAGNOSIS — Z79.899: ICD-10-CM

## 2020-02-05 DIAGNOSIS — I10: ICD-10-CM

## 2020-02-05 DIAGNOSIS — D64.9: Primary | ICD-10-CM

## 2020-02-05 DIAGNOSIS — D69.6: ICD-10-CM

## 2020-02-05 LAB
ALBUMIN SERPL BCG-MCNC: 3.2 G/DL (ref 3.4–4.8)
ALP SERPL-CCNC: 76 U/L (ref 40–110)
ALT SERPL W P-5'-P-CCNC: 7 U/L (ref 8–55)
ANION GAP SERPL CALC-SCNC: 16 MMOL/L (ref 10–20)
AST SERPL-CCNC: 14 U/L (ref 5–34)
BILIRUB SERPL-MCNC: 0.9 MG/DL (ref 0.2–1.2)
BUN SERPL-MCNC: 36 MG/DL (ref 8.4–25.7)
CALCIUM SERPL-MCNC: 8.7 MG/DL (ref 7.8–10.44)
CHLORIDE SERPL-SCNC: 102 MMOL/L (ref 98–107)
CK MB SERPL-MCNC: 4.4 NG/ML (ref 0–6.6)
CO2 SERPL-SCNC: 21 MMOL/L (ref 23–31)
CREAT CL PREDICTED SERPL C-G-VRATE: 0 ML/MIN (ref 70–130)
GLOBULIN SER CALC-MCNC: 4.3 G/DL (ref 2.4–3.5)
GLUCOSE SERPL-MCNC: 121 MG/DL (ref 83–110)
HGB BLD-MCNC: 10.5 G/DL (ref 14–18)
MCH RBC QN AUTO: 27.6 PG (ref 27–31)
MCV RBC AUTO: 85.1 FL (ref 78–98)
MDIFF COMPLETE?: YES
OVALOCYTES BLD QL SMEAR: (no result) (100X)
PLATELET # BLD AUTO: 217 THOU/UL (ref 130–400)
POLYCHROMASIA BLD QL SMEAR: (no result) (100X)
POTASSIUM SERPL-SCNC: 3.8 MMOL/L (ref 3.5–5.1)
RBC # BLD AUTO: 3.81 MILL/UL (ref 4.7–6.1)
SODIUM SERPL-SCNC: 135 MMOL/L (ref 136–145)
WBC # BLD AUTO: 14.7 THOU/UL (ref 4.8–10.8)

## 2020-02-05 PROCEDURE — 36430 TRANSFUSION BLD/BLD COMPNT: CPT

## 2020-02-05 PROCEDURE — 85025 COMPLETE CBC W/AUTO DIFF WBC: CPT

## 2020-02-05 PROCEDURE — 86901 BLOOD TYPING SEROLOGIC RH(D): CPT

## 2020-02-05 PROCEDURE — 71045 X-RAY EXAM CHEST 1 VIEW: CPT

## 2020-02-05 PROCEDURE — 83880 ASSAY OF NATRIURETIC PEPTIDE: CPT

## 2020-02-05 PROCEDURE — P9016 RBC LEUKOCYTES REDUCED: HCPCS

## 2020-02-05 PROCEDURE — 84484 ASSAY OF TROPONIN QUANT: CPT

## 2020-02-05 PROCEDURE — 82553 CREATINE MB FRACTION: CPT

## 2020-02-05 PROCEDURE — 30233N1 TRANSFUSION OF NONAUTOLOGOUS RED BLOOD CELLS INTO PERIPHERAL VEIN, PERCUTANEOUS APPROACH: ICD-10-PCS | Performed by: INTERNAL MEDICINE

## 2020-02-05 PROCEDURE — 96374 THER/PROPH/DIAG INJ IV PUSH: CPT

## 2020-02-05 PROCEDURE — 86850 RBC ANTIBODY SCREEN: CPT

## 2020-02-05 PROCEDURE — 94640 AIRWAY INHALATION TREATMENT: CPT

## 2020-02-05 PROCEDURE — 93005 ELECTROCARDIOGRAM TRACING: CPT

## 2020-02-05 PROCEDURE — 80053 COMPREHEN METABOLIC PANEL: CPT

## 2020-02-05 PROCEDURE — 86900 BLOOD TYPING SEROLOGIC ABO: CPT

## 2020-02-05 NOTE — RAD
Chest one view



HISTORY: Chest pain.



COMPARISON: 12/22/2019.



FINDINGS: Cardiac silhouette is magnified by projection. Pulmonary vasculature is now engorged with w
idespread reticulonodular interstitial prominence. Mediastinum is midline with aortic

calcification. Right subclavian Port-A-Cath in place.



No lobar consolidation or evidence of pneumothorax. Cardiac monitor leads overlie the chest.







IMPRESSION: Pulmonary vascular congestion.



Reported By: HECTOR Miranda 

Electronically Signed:  2/5/2020 3:34 PM

## 2020-03-14 ENCOUNTER — HOSPITAL ENCOUNTER (INPATIENT)
Dept: HOSPITAL 92 - ERS | Age: 80
LOS: 12 days | Discharge: HOME | DRG: 659 | End: 2020-03-26
Attending: HOSPITALIST | Admitting: HOSPITALIST
Payer: MEDICARE

## 2020-03-14 VITALS — BODY MASS INDEX: 21.8 KG/M2

## 2020-03-14 DIAGNOSIS — E87.8: ICD-10-CM

## 2020-03-14 DIAGNOSIS — E87.2: ICD-10-CM

## 2020-03-14 DIAGNOSIS — E87.0: ICD-10-CM

## 2020-03-14 DIAGNOSIS — R53.81: ICD-10-CM

## 2020-03-14 DIAGNOSIS — Z87.891: ICD-10-CM

## 2020-03-14 DIAGNOSIS — K59.00: ICD-10-CM

## 2020-03-14 DIAGNOSIS — D63.1: ICD-10-CM

## 2020-03-14 DIAGNOSIS — I12.0: ICD-10-CM

## 2020-03-14 DIAGNOSIS — G92: ICD-10-CM

## 2020-03-14 DIAGNOSIS — N18.5: ICD-10-CM

## 2020-03-14 DIAGNOSIS — T83.593A: Primary | ICD-10-CM

## 2020-03-14 DIAGNOSIS — E86.0: ICD-10-CM

## 2020-03-14 DIAGNOSIS — C67.9: ICD-10-CM

## 2020-03-14 DIAGNOSIS — N10: ICD-10-CM

## 2020-03-14 DIAGNOSIS — N13.9: ICD-10-CM

## 2020-03-14 DIAGNOSIS — N18.4: ICD-10-CM

## 2020-03-14 DIAGNOSIS — N17.9: ICD-10-CM

## 2020-03-14 DIAGNOSIS — A41.01: ICD-10-CM
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
## 2020-03-14 DIAGNOSIS — E87.6: ICD-10-CM

## 2020-03-14 DIAGNOSIS — E44.0: ICD-10-CM

## 2020-03-14 DIAGNOSIS — D63.0: ICD-10-CM

## 2020-03-14 LAB
ALBUMIN SERPL BCG-MCNC: 2.9 G/DL (ref 3.4–4.8)
ALP SERPL-CCNC: 72 U/L (ref 40–110)
ALT SERPL W P-5'-P-CCNC: (no result) U/L (ref 8–55)
ANION GAP SERPL CALC-SCNC: 21 MMOL/L (ref 10–20)
AST SERPL-CCNC: 9 U/L (ref 5–34)
BACTERIA UR QL AUTO: (no result) HPF
BASOPHILS # BLD AUTO: 0 THOU/UL (ref 0–0.2)
BASOPHILS NFR BLD AUTO: 0.2 % (ref 0–1)
BILIRUB SERPL-MCNC: 0.4 MG/DL (ref 0.2–1.2)
BUN SERPL-MCNC: 93 MG/DL (ref 8.4–25.7)
CALCIUM SERPL-MCNC: 8.5 MG/DL (ref 7.8–10.44)
CHLORIDE SERPL-SCNC: 99 MMOL/L (ref 98–107)
CO2 SERPL-SCNC: 16 MMOL/L (ref 23–31)
CREAT CL PREDICTED SERPL C-G-VRATE: 0 ML/MIN (ref 70–130)
EOSINOPHIL # BLD AUTO: 0.1 THOU/UL (ref 0–0.7)
EOSINOPHIL NFR BLD AUTO: 0.5 % (ref 0–10)
GLOBULIN SER CALC-MCNC: 3.9 G/DL (ref 2.4–3.5)
GLUCOSE SERPL-MCNC: 99 MG/DL (ref 83–110)
HGB BLD-MCNC: 8.6 G/DL (ref 14–18)
LYMPHOCYTES # BLD: 1.1 THOU/UL (ref 1.2–3.4)
LYMPHOCYTES NFR BLD AUTO: 7.4 % (ref 21–51)
MCH RBC QN AUTO: 29 PG (ref 27–31)
MCV RBC AUTO: 87.3 FL (ref 78–98)
MONOCYTES # BLD AUTO: 0.7 THOU/UL (ref 0.11–0.59)
MONOCYTES NFR BLD AUTO: 4.7 % (ref 0–10)
NEUTROPHILS # BLD AUTO: 12.6 THOU/UL (ref 1.4–6.5)
NEUTROPHILS NFR BLD AUTO: 87.1 % (ref 42–75)
PLATELET # BLD AUTO: 224 THOU/UL (ref 130–400)
POTASSIUM SERPL-SCNC: 4.4 MMOL/L (ref 3.5–5.1)
PROT UR STRIP.AUTO-MCNC: (no result) MG/DL
RBC # BLD AUTO: 2.98 MILL/UL (ref 4.7–6.1)
SODIUM SERPL-SCNC: 132 MMOL/L (ref 136–145)
WBC # BLD AUTO: 14.5 THOU/UL (ref 4.8–10.8)

## 2020-03-14 PROCEDURE — 85610 PROTHROMBIN TIME: CPT

## 2020-03-14 PROCEDURE — 80202 ASSAY OF VANCOMYCIN: CPT

## 2020-03-14 PROCEDURE — 87086 URINE CULTURE/COLONY COUNT: CPT

## 2020-03-14 PROCEDURE — 93970 EXTREMITY STUDY: CPT

## 2020-03-14 PROCEDURE — 93005 ELECTROCARDIOGRAM TRACING: CPT

## 2020-03-14 PROCEDURE — 50433 PLMT NEPHROURETERAL CATHETER: CPT

## 2020-03-14 PROCEDURE — G0257 UNSCHED DIALYSIS ESRD PT HOS: HCPCS

## 2020-03-14 PROCEDURE — 71045 X-RAY EXAM CHEST 1 VIEW: CPT

## 2020-03-14 PROCEDURE — C1729 CATH, DRAINAGE: HCPCS

## 2020-03-14 PROCEDURE — 50436 DILAT XST TRC NDURLGC PX: CPT

## 2020-03-14 PROCEDURE — 70450 CT HEAD/BRAIN W/O DYE: CPT

## 2020-03-14 PROCEDURE — 87040 BLOOD CULTURE FOR BACTERIA: CPT

## 2020-03-14 PROCEDURE — 80053 COMPREHEN METABOLIC PANEL: CPT

## 2020-03-14 PROCEDURE — 85027 COMPLETE CBC AUTOMATED: CPT

## 2020-03-14 PROCEDURE — 85025 COMPLETE CBC W/AUTO DIFF WBC: CPT

## 2020-03-14 PROCEDURE — 85018 HEMOGLOBIN: CPT

## 2020-03-14 PROCEDURE — 85014 HEMATOCRIT: CPT

## 2020-03-14 PROCEDURE — 87077 CULTURE AEROBIC IDENTIFY: CPT

## 2020-03-14 PROCEDURE — 74176 CT ABD & PELVIS W/O CONTRAST: CPT

## 2020-03-14 PROCEDURE — 85730 THROMBOPLASTIN TIME PARTIAL: CPT

## 2020-03-14 PROCEDURE — 81003 URINALYSIS AUTO W/O SCOPE: CPT

## 2020-03-14 PROCEDURE — 80048 BASIC METABOLIC PNL TOTAL CA: CPT

## 2020-03-14 PROCEDURE — 85007 BL SMEAR W/DIFF WBC COUNT: CPT

## 2020-03-14 PROCEDURE — G0365 VESSEL MAPPING HEMO ACCESS: HCPCS

## 2020-03-14 PROCEDURE — 81015 MICROSCOPIC EXAM OF URINE: CPT

## 2020-03-14 PROCEDURE — 87186 SC STD MICRODIL/AGAR DIL: CPT

## 2020-03-14 PROCEDURE — 87340 HEPATITIS B SURFACE AG IA: CPT

## 2020-03-14 PROCEDURE — 83735 ASSAY OF MAGNESIUM: CPT

## 2020-03-14 PROCEDURE — 87804 INFLUENZA ASSAY W/OPTIC: CPT

## 2020-03-14 PROCEDURE — 90935 HEMODIALYSIS ONE EVALUATION: CPT

## 2020-03-14 PROCEDURE — 50430 NJX PX NFROSGRM &/URTRGRM: CPT

## 2020-03-14 PROCEDURE — 85049 AUTOMATED PLATELET COUNT: CPT

## 2020-03-14 PROCEDURE — 96361 HYDRATE IV INFUSION ADD-ON: CPT

## 2020-03-14 PROCEDURE — 96365 THER/PROPH/DIAG IV INF INIT: CPT

## 2020-03-14 PROCEDURE — 36415 COLL VENOUS BLD VENIPUNCTURE: CPT

## 2020-03-14 PROCEDURE — 75984 XRAY CONTROL CATHETER CHANGE: CPT

## 2020-03-14 PROCEDURE — 87149 DNA/RNA DIRECT PROBE: CPT

## 2020-03-14 PROCEDURE — 83605 ASSAY OF LACTIC ACID: CPT

## 2020-03-14 RX ADMIN — HYDROCODONE BITARTRATE AND ACETAMINOPHEN PRN TAB: 5; 325 TABLET ORAL at 23:41

## 2020-03-14 RX ADMIN — Medication SCH: at 20:23

## 2020-03-14 RX ADMIN — MOMETASONE FUROATE AND FORMOTEROL FUMARATE DIHYDRATE SCH PUFF: 200; 5 AEROSOL RESPIRATORY (INHALATION) at 18:25

## 2020-03-14 NOTE — RAD
Chest one view



HISTORY: Fever.



COMPARISON: 2/5/2020.



FINDINGS: Cardiac silhouette is magnified and upper limits of normal in size. Shallow inspiration acc
entuates pulmonary markings. Mediastinum is midline with a right subclavian Port-A-Cath.



Calcified granuloma at the right lung base is consistent with healed granulomatous disease.



Lateral margin of the left hemidiaphragm is indistinct with subtle increase in parenchymal opacity.



No evidence of pneumothorax.











IMPRESSION: Mild left lateral lung base infiltrate with possibly a small amount of left pleural fluid
. Clinical correlation regarding other signs and symptoms of left basilar pneumonitis is required.



Reported By: HECTOR Miranda 

Electronically Signed:  3/14/2020 8:30 AM

## 2020-03-14 NOTE — HP
PRIMARY CARE PROVIDER:  Dennys Esquivel MD



CHIEF COMPLAINT:  Fever and generalized weakness.



HISTORY OF PRESENT ILLNESS:  This is a 79-year-old  male who presents to

[facility] Emergency Department, complaining of a fever up to 101 degrees

Fahrenheit in the last 24 hours with generalized weakness, decreased oral intake,

and lethargy.  The history is obtained after review of the electronic medical record

from the emergency room as well as discussions with the patient's daughter at the

bedside.  The patient has a significant history of a bladder carcinoma, currently

completing chemotherapy approximately 8 weeks prior to this evaluation.  The

daughter reports the last treatment was held due to generalized weakness,

deconditioning, and poor overall performance status with plans for surgical

intervention at [facility] in early 04/2020.  The patient has been

treated with a nephrostomy tube placement as well as left nephroureteral stent due

to history of prostate and bladder carcinoma.  The daughter reported decreased

output and dark-appearing urine in the nephrostomy bag and became concerned,

thinking this was a clogged tube.  The daughter reports decreased activity level

with a recent prescription given for outpatient physical therapy due to the

patient's limited mobility at home.  No fall history reported, recent travel, new

medication exposure, or family members with similar symptoms.  The patient's

daughter states he does not stay hydrated and is constantly reminded by the daughter

to take in more fluids.  In the emergency room, the patient underwent general

evaluation with CT of the abdomen and pelvis showing appropriate position of left

nephrostomy tube and left nephroureteral stent placement.  The patient was meeting

sepsis criteria with temperature of 101.7 degrees Fahrenheit, and given IV

vancomycin and cefepime.  The patient also received intravenous normal saline and

acetaminophen. 



PAST MEDICAL HISTORY:  

1. Urothelial carcinoma status post chemotherapy with plans for surgical resection.

2. Prostate carcinoma.

3. Obstructive uropathy status post nephrostomy and left nephroureteral stent

placement. 

4. Chronic kidney disease, stage 4.

5. Remote tobacco use.

6. Hypertension.



PAST SURGICAL HISTORY:  

1. Status post hernia repair.

2. Status post left nephroureteral stent placement.

3. Status post nephrostomy placement.

4. Status post cystoscopy.

5. Status post right subclavian MediPort placement.



CURRENT MEDICATIONS:  

1. Enteric-coated aspirin 81 mg p.o. daily.

2. Nexium 40 mg p.o. daily.

3. Amlodipine 5 mg p.o. daily.

4. Advair Diskus one inhalation daily.

5. Lasix 20 mg p.o. daily.

6. Gabapentin 100 mg p.o. nightly.

7. Norco 5/325 mg one tablet p.o. q.6 h. p.r.n. pain.

8. Docusate two tablets p.o. daily.

9. Tamsulosin 0.4 mg p.o. b.i.d.



ALLERGIES:  NO KNOWN DRUG ALLERGIES.



FAMILY HISTORY:  No inheritable diseases per family report.



SOCIAL HISTORY:  Resides in [place], [place].  Retired.  Remote tobacco use, quitting

over 10 years prior to this evaluation.  Remote alcohol use, none currently.  No

illicit drug use.  Ambulates with a rolling walker and contact guard assistance.  No

recent falls. 



REVIEW OF SYSTEMS:  CONSTITUTIONAL:  Negative for weight loss or gain, ability to

conduct usual activities. 

SKIN:  Negative for rash, itching. 

EYES:  Negative for double vision, pain. 

ENT/MOUTH:  Negative for nose bleeding, neck stiffness, pain, tenderness. 

CARDIOVASCULAR:  Negative for palpitations, dyspnea on exertion, orthopnea. 

RESPIRATORY:  Negative for shortness of breath, wheezing, cough, hemoptysis, or

night sweats. 

GASTROINTESTINAL:  Negative for poor appetite, abdominal pain, heartburn, nausea,

vomiting, constipation, or diarrhea. 

GENITOURINARY:  Negative for urgency, frequency, dysuria, nocturia. 

MUSCULOSKELETAL:  Negative for pain, swelling. 

NEUROLOGIC/PSYCHIATRIC:  Negative for anxiety, depression. 

ALLERGY/IMMUNOLOGIC:  Negative for skin rash, bleeding tendency. 



Otherwise negative except as stated per HPI.



PHYSICAL EXAMINATION:

VITAL SIGNS:  On admission; blood pressure 158/76, pulse 102, respiratory rate 19,

temperature 101.7 degrees Fahrenheit rectally, and O2 saturation 97% on room air. 

GENERAL APPEARANCE:  This is a 79-year-old  male, pale appearing,

responsive to questions, in mild distress. 

HEENT:  Pupils are equal, round, and reactive to light and accommodation.

Extraocular muscles are intact.  No scleral icterus.  No conjunctival injection.

Nares patent.  OP is clear.  Oral mucosa dry. 

NECK:  Supple.  No cervical adenopathy.  No thyromegaly.  No carotid bruits.  No JVD

appreciated.  Cervical spine with full active and passive range of motion.  No

meningeal signs noted. 

CHEST:  Lungs are clear to auscultation bilaterally. 

CARDIOVASCULAR:  S1 and S2 without noted murmur, rub, or gallop. 

ABDOMEN:  Distended with tenderness to palpation diffusely.  No rebound or guarding.

 Bowel sounds are positive in all 4 quadrants.  Left flank with a nephrostomy tube

in place.  Nephrostomy bag with dark slava urine. 

EXTREMITIES:  Warm and dry with poor skin turgor.  Pulses palpable distally at the

dorsalis pedis, posterior tibial, and popliteal arteries bilaterally.  Capillary

refill less than 2 seconds. 

NEUROLOGIC:  Cranial nerves 2 through 12 are grossly intact.  Generalized weakness

on exam.  No focal deficits appreciated.  Not observed ambulatory during this exam. 



PERTINENT LABORATORY DATA AND X-RAY FINDINGS:  Sodium 132, potassium 4.4, chloride

99, CO2 of 16, BUN 93, creatinine 5.78, estimated GFR of 10, and glucose 99.  Lactic

acid level 0.9.  Calcium 8.5.  LFTs within normal limits.  Albumin 2.9.  CBC showed

a white blood cell count of 14.5, hemoglobin 8.6, hematocrit 26, and platelet count

224 with 87% neutrophilia.  Urinalysis showed a turbid sample, greater than 300

protein, large blood and leukocyte esterase, greater than 50 rbc's and wbc's per

high-powered field.  Portable chest x-ray dated 03/14/2020 showed mild left lateral

lung base infiltrate versus atelectatic changes.  CT of the abdomen and pelvis dated

03/14/2020 showed bilateral moderate hydronephrosis and hydroureter extending to the

level of the bladder.  No dislodged left nephroureteral stent or percutaneous

nephrostomy catheter.  Bilateral perinephric fat stranding noted.  EKG dated

03/14/2020 by my interpretation shows sinus mechanism with heart rates in the 90s.

Attenuated R-waves noted in the precordial leads.  T-wave inversion in leads V4

through V6.  Left axis deviation. 



ASSESSMENT AND PLAN:  

1. Sepsis syndrome secondarily to urinary tract infection.  The patient will be

admitted to the medical floor and continue IV fluids with normal saline at 75 mL/h.

Continue cefepime 1 g IV q.12 h. with additional vancomycin 1 g q.24 h.  Urine and

blood cultures pending.  Initial lactic acid level 0.9. 

2. Acute on chronic kidney disease.  Suspect multifactorial including decreased p.o.

intake of fluids.  We will continue IV fluids with normal saline and avoid

nephrotoxic agents and limit contrast exposure.  Consult Nephrology Service for any

further recommendations.  Continue to monitor urine output through the nephrostomy

tube. 

3. Dehydration, multifactorial.  Continue IV fluids as outlined previously and

encourage free water intake orally. 

4. Bladder carcinoma.  Current plans for surgical intervention in 04/2020.  The

patient to continue followup with his primary urologist, Dr. Matute at [facility]. 

5. Constipation.  Magnesium citrate and Dulcolax suppository today.

6. Deconditioning.  General fall risk precautions.  PT evaluation for functional

assessment.  Outpatient physical therapy recommended. 

7. Normocytic anemia due to chronic kidney disease and bladder cancer.  No evidence

of active blood loss currently.  Continue serial hemoglobin and hematocrit

monitoring.  Repeat CBC in the a.m. 

8. Prophylaxis.  Sequential compression devices while in bed.  Pepcid 20 mg p.o.

b.i.d.  PT evaluation for functional assessment. 

9. Code status is full.  Surrogate medical decision maker is the patient's daughter.







Job ID:  [id number]

## 2020-03-14 NOTE — CT
PRELIMINARY REPORT/[facility]/AFTER HOURS PROCEDURE



CT ABDOMEN AND PELVIS WITHOUT INTRAVENOUS CONTRAST:



CLINICAL HISTORY: 

87 M presents to the ED for evaluation of fever and decreased output from nephrostomy tube. Pt states
 he has not been drinking as much water as he usually does. Pt states he does not know the last

time his nephrostomy tube was changed. Per EMS daughter reports fever of 101 at home. Pt reports coug
h x 1 week. Pt reports hx of cancer.



TECHNIQUE:

Axial computed tomography images of the abdomen and pelvis without intravenous contrast.



CONTRAST:

None.



COMPARISON:

None provided.



FINDINGS:

LUNG BASES: Small bilateral pleural effusions with bibasilar atelectasis.  Generalized cardiomegaly s
een.



LIVER: Unremarkable.



GALLBLADDER AND BILE DUCTS: Gallbladder is distended.  No radiopaque stones are identified.  No ducta
l dilatation is seen.



PANCREAS: Unremarkable.



SPLEEN: Spleen is enlarged measuring 13.7 cm in long axis.



ADRENAL GLANDS: Unremarkable.



KIDNEYS, URETERS, AND BLADDER: Bilateral moderate hydronephrosis.  A left nephroureteral stent and le
ft percutaneous nephrostomy catheter is in place.  Nonspecific perinephric fat stranding is seen. 

Hydroureter is seen extending to the bladder on the right.  The bladder demonstrates thickening of it
s walls.  Perivesical fat stranding is identified.



STOMACH AND BOWEL: No obstruction. No wall thickening. No CT evidence of colitis or acute diverticuli
tis.



APPENDIX: No CT evidence for appendicitis.



PERITONEUM: No free fluid. No free air.



LYMPH NODES: No lymphadenopathy.



VASCULATURE: No aortic aneurysm.  Atherosclerotic calcifications of the abdominal aorta and iliac art
eries is seen.



ABDOMINAL WALL AND SOFT TISSUES: Unremarkable.



BONES: No fracture or suspicious osseous abnormality.



IMPRESSION:

Bilateral moderate hydronephrosis and hydroureter extending to the level of the bladder demonstrating
 thickening of its walls.  Findings can be seen with cystitis.  No dislodgment of the left

nephroureteral stent or percutaneous nephrostomy catheter is identified.  Bilateral perinephric fat s
tranding is seen.  Superimposed pyelonephritis not entirely excluded.





ELECTRONICALLY SIGNED BY:

Fei Bah MD



Mar 14, 2020 7:02:25 AM CDT





This report is intended for review by the ordering physician only, in accordance of law. If you recei
ve this report in error, please call [facility] at [phone].



FINAL REPORT



CT ABDOMEN AND PELVIS WITHOUT CONTRAST PERFORMED ON AN EMERGENCY BASIS:



03/14/2020



0548 HOURS



HISTORY:

Fever. Diminished output. Left PCN.



COMPARISON:

11/26/2019.



FINDINGS:

Agree with the preliminary report by Dr. Bah from [facility].



Left PCN and ureteral stent remain in place. Distention of the left renal collecting system and urete
r, moderate, has increased slightly since the prior exam.



Moderate right hydroureteronephrosis has also progressed slightly since the previous study.



Stranding within the retroperitoneal fat, left greater than right, is not significantly changed.



No evidence of bowel obstruction. Subtle, nonspecific wall thickening involving the sigmoid and rectu
m is noted.



Minimal left pleural fluid. Mild atelectasis at each lung base.



CODE QA



Transcribed Date/Time: 3/14/2020 8:54 AM



Reported By: HECTOR Miranda 

Electronically Signed:  3/14/2020 10:14 AM

## 2020-03-15 LAB
ALBUMIN SERPL BCG-MCNC: 2.8 G/DL (ref 3.4–4.8)
ALP SERPL-CCNC: 234 U/L (ref 40–110)
ALT SERPL W P-5'-P-CCNC: 8 U/L (ref 8–55)
ANION GAP SERPL CALC-SCNC: 20 MMOL/L (ref 10–20)
ANISOCYTOSIS BLD QL SMEAR: (no result) (100X)
AST SERPL-CCNC: 13 U/L (ref 5–34)
BILIRUB SERPL-MCNC: 0.4 MG/DL (ref 0.2–1.2)
BUN SERPL-MCNC: 95 MG/DL (ref 8.4–25.7)
CALCIUM SERPL-MCNC: 8.3 MG/DL (ref 7.8–10.44)
CHLORIDE SERPL-SCNC: 99 MMOL/L (ref 98–107)
CO2 SERPL-SCNC: 15 MMOL/L (ref 23–31)
CREAT CL PREDICTED SERPL C-G-VRATE: 8 ML/MIN (ref 70–130)
GLOBULIN SER CALC-MCNC: 3.7 G/DL (ref 2.4–3.5)
GLUCOSE SERPL-MCNC: 96 MG/DL (ref 83–110)
HGB BLD-MCNC: 7.3 G/DL (ref 14–18)
MCH RBC QN AUTO: 29.8 PG (ref 27–31)
MCV RBC AUTO: 87.1 FL (ref 78–98)
MDIFF COMPLETE?: YES
PLATELET # BLD AUTO: 203 THOU/UL (ref 130–400)
POIKILOCYTOSIS BLD QL SMEAR: (no result) (100X)
POTASSIUM SERPL-SCNC: 5.1 MMOL/L (ref 3.5–5.1)
RBC # BLD AUTO: 2.44 MILL/UL (ref 4.7–6.1)
SODIUM SERPL-SCNC: 129 MMOL/L (ref 136–145)
VANCOMYCIN SERPL-MCNC: 13.5 UG/ML
WBC # BLD AUTO: 12.3 THOU/UL (ref 4.8–10.8)

## 2020-03-15 RX ADMIN — Medication SCH: at 20:24

## 2020-03-15 RX ADMIN — MOMETASONE FUROATE AND FORMOTEROL FUMARATE DIHYDRATE SCH: 200; 5 AEROSOL RESPIRATORY (INHALATION) at 19:14

## 2020-03-15 RX ADMIN — SODIUM BICARBONATE SCH MG: 325 TABLET ORAL at 20:23

## 2020-03-15 RX ADMIN — SODIUM BICARBONATE SCH MG: 325 TABLET ORAL at 16:33

## 2020-03-15 RX ADMIN — Medication SCH: at 08:44

## 2020-03-15 RX ADMIN — ASPIRIN SCH MG: 81 TABLET ORAL at 08:23

## 2020-03-15 RX ADMIN — MOMETASONE FUROATE AND FORMOTEROL FUMARATE DIHYDRATE SCH PUFF: 200; 5 AEROSOL RESPIRATORY (INHALATION) at 06:29

## 2020-03-15 RX ADMIN — VANCOMYCIN HYDROCHLORIDE SCH: 1 INJECTION, SOLUTION INTRAVENOUS at 11:22

## 2020-03-15 RX ADMIN — DOCUSATE SODIUM 50 MG AND SENNOSIDES 8.6 MG SCH TAB: 8.6; 5 TABLET, FILM COATED ORAL at 08:24

## 2020-03-15 NOTE — PRG
DATE OF SERVICE:  03/15/2020



SUBJECTIVE:  A 79-year-old gentleman, being seen for acute kidney injury.  The

patient denies any nausea, vomiting, or chest pain. 



OBJECTIVE:  GENERAL:  The patient is awake and alert. 

VITAL SIGNS:  Afebrile, pulse 75, breathing at 16, blood pressure 130/64. 

HEENT:  Head normocephalic and atraumatic.  Eyes intact, no ulcers.  Nose intact, no

ulcers.  Ears intact, no ulcers. 

NECK:  Supple.  No JVD. 

CHEST:  Symmetrical and clear. 

CARDIOVASCULAR:  Shows S1 and S2, no rub, no murmur. 

GASTROINTESTINAL:  Abdomen is soft, bowel sounds positive. 

EXTREMITIES:  Show no edema or ulcers. 

SKIN:  Shows no rash or petechiae. 

MUSCULOSKELETAL:  Shows no joint swelling or stiffness. 

GENITOURINARY:  Shows no Del Angel or CVA tenderness. 

NEUROLOGIC:  Motor intact.  Cranial nerves intact.



LABORATORY DATA:  Reviewed.



ASSESSMENT AND PLAN:  

1. Acute kidney injury with chronic kidney disease, stage 5.  Management per primary

team. 

2. Hypertension, stable.

3. Anemia, stable.  No indication for dialysis.

4. Metabolic acidosis.  Start the patient on sodium bicarbonate 650 t.i.d.







Job ID:  [id number]

## 2020-03-15 NOTE — CON
DATE OF CONSULTATION:  03/15/2020



+Lila is a 79-year-old white male, whom I have been asked to see regarding elevated

creatinine.  He has a complicated urologic history.  He has a history of prostate

cancer and the cancer in the past was treated with radiation for this.

Subsequently, he has developed bladder cancer and he has actually gone through

chemotherapy with Dr. Herron here that finished about six weeks ago and his plans are

I think to go to [place] for a cystectomy and probably an ileal conduit.  He has

developed I guess obstruction to the left kidney and he had a stent placed in the

left kidney in July of last year, I think Dr. Matute.  Since that time, he has also

had a left nephrostomy tube placed.  He had a CAT scan done here in November.  I

thought he had a prior CAT scan here in November and I cannot find it.  He did have

a renal ultrasound that showed some mild right hydronephrosis.  He came into the

hospital a couple days ago.  His wife says that he has not been eating well, hardly

drinking anything at home and had a fever to 101.0.  The fever was why she brought

him in.  He has had cultures of blood and urine done and he is on vancomycin and

cefepime that looks like it has been adjusted for his elevated creatinine.  His

creatinine is elevated, currently it is 6.3.  It is up a little bit from what it was

when he came in yesterday when it was 5.78.  It was 2.4 a couple weeks ago, so this

is an abrupt change.  His BUN is also quite high 93.  He has made some urine through

the left nephrostomy tube.  It looks like he has made about 250 mL this morning.  He

does not void much per urethra.  His wife says occasionally he will leak a little

bit when he is sleeping.  However, since he had a percutaneous tube and stent

placed, most of it has been coming out just the stent, so I am assuming that the

right side has been refluxing up the left stent and out the left nephrostomy tube.

He had a CAT scan done, which was yesterday, that I reviewed.  He has some right

hydronephrosis and some left hydronephrosis, both been perked and the stent appeared

to be in decent position.  He has relatively thick bladder walls, but his bladder is

not distended.  He is being bolused with some fluids today.  He had some yesterday,

but they have increased that today and appears that his urine output has picked up

some.  He did have a CAT scan done in November of last year, that did show the

left-sided nephrostomy tube and left ureteral stent to be in good condition and

there was right-sided hydronephrosis on that study.  So, it does not look like on

x-ray that the right hydro has a greatly accelerated, but there it probably is a

little bit more hydronephrotic than it was.  The main concern is just elevation in

creatinine.  Looking at his vital signs; his pulse has been around 90 to 100.  He

has not had a temperature since he get in.  His blood pressure has been acceptable.

He has lower abdominal pain.  His bladder does not appear to be distended on CT and

on exam, his bladder is not distended, but that is where he is tender.  He has both

left and right lower quadrants as well as suprapubic region.  He is not having any

significant right CVA tenderness.  One of two positive blood cultures grew Staph

species, so this possibly was contamination.  The rest the cultures are still

pending.  I have talked with Nephrology as well as the hospitalist.  The plan will

be for him to be hydrated today and repeat labs in the morning.  We will keep him

n.p.o. after midnight.  I have notified Dr. Matute about him.  It maybe that he

will require a perc placement on the right or possibly a stent on the right.  I do

not know how difficult it would be to put a stent up on the right side in terms of

his bladder cancer and in terms of the thickening of the bladder wall, I leave that

up to Dr. Matute.  So today, we will concentrate on hydration and potential

prerenal causes for his elevated creatinine and then tomorrow if necessary to look

at treatment for post renal causes. 







Job ID:  [id number]

## 2020-03-15 NOTE — CON
DATE OF CONSULTATION:  03/14/2020



REASON FOR CONSULTATION:  Elevated creatinine.



HISTORY OF PRESENT ILLNESS:  This is a very pleasant 79-year-old gentleman, seen in

the [facility], presented to the hospital on March 14 with fever and

generalized weakness.  The patient was noted to have a creatinine of 5.78.  His

prior baseline on March 3rd was 2.4 to 2.6.  The patient was noted to have

hydronephrosis and has also seen Urology. 



PAST MEDICAL HISTORY:  Urothelial carcinoma, status post chemotherapy to the rectum,

prostate cancer, obstructive uropathy, nephrostomy due to CKD stage 4, hypertension,

tobacco use, history of hernia repair, stent placement, cystoscopy, and MediPort

placement. 



MEDICATIONS:  Home medications list reviewed. 



Hospital medications list reviewed.



ALLERGIES:  REVIEWED.



REVIEW OF SYSTEMS:  15-point review of systems was performed and negative except for

positives noted above. 

HEENT:  Eyes intact, no diplopia.  Ears:  No hearing loss or earache.  Nose:  No

discharge or bleeding. 

Chest:  No cough or phlegm. 

Abdomen:  No nausea or vomiting. 

Genitourinary:  No hematuria.  No Del Angel catheter. 

Musculoskeletal:  No low back pain.  No joint swelling or pain. 

Neurological:  No syncope.  No seizures. 

Skin:  No complaints of rash or itching. 

Psychiatric:  No depression. 

Constitutional:  No weight loss or loss of appetite.



PHYSICAL EXAMINATION:

GENERAL:  The patient is awake and alert. 

VITAL SIGNS:  Afebrile, pulse 100, breathing at 16, and blood pressure 134/70. 

HEENT:  Head normocephalic and atraumatic.  Eyes intact, no ulcers.  Nose intact, no

ulcers.  Ears intact, no ulcers. 

Neck:  Supple.  No JVD. 

Chest:  Symmetrical and clear. 

Cardiovascular:  Shows S1 and S2, no rub, no murmur. 

Gastrointestinal:  Abdomen is soft, bowel sounds positive. 

Extremities:  Show no edema or ulcers. 

Skin:  Shows no rash or petechiae. 

Musculoskeletal:  Shows no joint swelling or stiffness. 

Genitourinary:  Shows no Del Angel or CVA tenderness. 

Neurologic:  Motor intact.  Cranial nerves intact.



LABORATORY DATA:  Reviewed.



ASSESSMENT AND PLAN:  

1. Chronic kidney disease, stage 5 with acute kidney injury.  Agree with Urology

evaluation. 

2. Hypertension, stable.

3. Anemia, stable. 

No indication for dialysis.  We will follow labs in the morning.







Job ID:  [id number]

## 2020-03-15 NOTE — PDOC.HOSPP
- Subjective


Encounter Date: 03/15/20


Encounter Time: 11:40


Subjective: 





f/u for UTI with sepsis on context of nephrostomy placement and bladder cancer 

tx with Cefepime/Vancomycin. 





- Objective


Vital Signs & Weight: 


 Vital Signs (12 hours)











  Temp Pulse Resp BP BP Pulse Ox


 


 03/15/20 11:33  98.0 F  69  20   130/65  97


 


 03/15/20 08:24   102 H   149/61 H  


 


 03/15/20 08:00  98.5 F  102 H  18   97/55 L  100


 


 03/15/20 06:29   93  16    99


 


 03/15/20 04:56  97.4 F L  102 H  18   149/61 H  100








 Weight











Weight                         138 lb 11.2 oz














I&O: 


 











 03/14/20 03/15/20 03/16/20





 06:59 06:59 06:59


 


Intake Total  1400 


 


Balance  1400 











Result Diagrams: 


 03/15/20 05:45





 03/15/20 04:37


Additional Labs: 





Microbiology





03/14/20 04:27   Nasal swab   Influenza Types A,B Direct EIA - Final


03/14/20 04:35   Venous blood - Left Arm   Blood Culture - Preliminary


                              Specimen has been received and culture in 

progress.


                              No Growth to date.


03/14/20 04:27   Urine Nephrostomy tube   Urine Culture - Preliminary


03/14/20 04:19   Venous blood - Left Arm   Blood Culture - Preliminary


                              Staphylococcus aureus





 Laboratory Tests











  03/14/20 03/14/20 03/15/20





  04:19 04:19 04:37


 


WBC  14.5 H  


 


Hgb  8.6 L  


 


Neutrophils %  87.1 H  


 


Neutrophils % (Manual)   


 


Sodium   132 L 


 


BUN   93 H 


 


Creatinine   5.78 H 


 


Random Vancomycin    13.5














  03/15/20





  05:45


 


WBC 


 


Hgb 


 


Neutrophils % 


 


Neutrophils % (Manual)  82 H


 


Sodium 


 


BUN 


 


Creatinine 


 


Random Vancomycin 














Hospitalist ROS





- Medication


Medications: 


Active Medications











Generic Name Dose Route Start Last Admin





  Trade Name Freq  PRN Reason Stop Dose Admin


 


Acetaminophen  1,000 mg  03/14/20 09:45  03/15/20 08:24





  Tylenol  PO   1,000 mg





  Q6H PRN   Administration





  Mild Pain (1-3)   





     





     





     


 


Hydrocodone Bitart/Acetaminophen  1 tab  03/14/20 09:45  03/14/20 23:41





  [place] 5/325  PO   1 tab





  Q6H PRN   Administration





  Pain   





     





     





     


 


Amlodipine Besylate  5 mg  03/15/20 09:00  03/15/20 08:24





  Norvasc  PO   5 mg





  DAILY PREET   Administration





     





     





     





     


 


Aspirin  81 mg  03/15/20 09:00  03/15/20 08:23





  Ecotrin  PO   81 mg





  DAILY PREET   Administration





     





     





     





     


 


Bisacodyl  10 mg  03/14/20 10:07  03/15/20 08:26





  Dulcolax  AZ   10 mg





  Q8H PRN   Administration





  Constipation   





     





     





     


 


Famotidine  20 mg  03/14/20 21:00  03/15/20 08:24





  Pepcid  PO   20 mg





  BID PREET   Administration





     





     





     





     


 


Gabapentin  100 mg  03/14/20 21:00  03/14/20 20:23





  Neurontin  PO   100 mg





  HS PREET   Administration





     





     





     





     


 


Sodium Chloride  1,000 mls @ 75 mls/hr  03/14/20 09:45  03/14/20 23:45





  Normal Saline 0.9%  IV   1,000 mls





  [id number] PREET   Administration





     





     





     





     


 


Cefepime HCl 1 gm/ Sodium  100 mls @ 200 mls/hr  03/14/20 21:00  03/15/20 08:25





  Chloride  IVPB   100 mls





  Q12HR PREET   Administration





     





     





     





     


 


Vancomycin HCl 1 gm/ Device  200 mls @ 200 mls/hr  03/15/20 09:00  03/15/20 11:

22





  IVPB   Not Given





  DAILY PREET   





     





     





     





     


 


Mometasone Furoate/Formoterol Fumar  2 puff  03/14/20 18:30  03/15/20 06:29





  Dulera 200 Mcg/5 Mcg Inhaler  INH   2 puff





  BID-RT PREET   Administration





     





     





     





     


 


Senna/Docusate Sodium  2 tab  03/15/20 09:00  03/15/20 08:24





  Senokot S  PO   2 tab





  DAILY PREET   Administration





     





     





     





     


 


Sodium Chloride  10 ml  03/14/20 21:00  03/15/20 08:44





  Flush - Normal Saline  IVF   Not Given





  Q12HR PREET   





     





     





     





     


 


Tamsulosin HCl  0.4 mg  03/14/20 21:00  03/15/20 08:24





  Flomax  PO   0.4 mg





  BID PREET   Administration





     





     





     





     














- Exam


General Appearance: awake alert, ill appearing


Eye: PERRL, anicteric sclera


ENT: normocephalic atraumatic, no oropharyngeal lesions


Neck: supple, symmetric, no JVD, no thyromegaly, no lymphadenopathy


Heart: RRR, no murmur, no gallops, no rubs, normal peripheral pulses


Respiratory: CTAB, no wheezes, no rales, no ronchi, normal chest expansion


Gastrointestinal: soft, normal bowel sounds, no palpable masses, no hepatomegaly

, no guarding, no rigidity


Gastrointestinal - other findings: L nephrostomy tube in place with slava urine

, mild distention and TTP 


Extremities: no cyanosis, no clubbing, no edema


Skin: normal turgor, no lesions


Neurological: cranial nerve grossly intact, no new deficit


Musculoskeletal: normal tone, generalized weakness


Psychiatric: normal affect, A&O x 3





Hosp A/P


(1) Sepsis due to gram-negative UTI


Code(s): A41.50 - GRAM-NEGATIVE SEPSIS, UNSPECIFIED; N39.0 - URINARY TRACT 

INFECTION, SITE NOT SPECIFIED   Status: Acute   


Plan: 


Await final Ucx results, continue Cefepime/Vancomycin, continue IVF's








(2) Acute kidney injury superimposed on CKD


Code(s): N17.9 - ACUTE KIDNEY FAILURE, UNSPECIFIED; N18.9 - CHRONIC KIDNEY 

DISEASE, UNSPECIFIED   Status: Acute   


Plan: 


Worsening renal function, avoid nephrotoxic meds, serial monitoring, flush 

nephrostomy tube








(3) Acute on chronic anemia


Code(s): D64.9 - ANEMIA, UNSPECIFIED   Status: Acute   


Plan: 


Suspect dilutional component, serial H/H monitoring, repeat CBC in am








(4) Constipation


Code(s): K59.00 - CONSTIPATION, UNSPECIFIED   Status: Acute   


Qualifiers: 


   Constipation type: slow transit constipation   Qualified Code(s): K59.01 - 

Slow transit constipation   


Plan: 


Continue bowel regimen, Dulcolax supp, OOB/ambulate, Fleets enema today








(5) Physical deconditioning


Code(s): R53.81 - OTHER MALAISE   Status: Chronic   


Plan: 


PT for mobilization, fall risk precautions








(6) Moderate protein-calorie malnutrition


Code(s): E44.0 - MODERATE PROTEIN-CALORIE MALNUTRITION   Status: Chronic   


Plan: 


Regular diet, Ensure Enlive BID








- Plan


plan discussed w/ family, continue antibiotics, PT/OT, , out of 

bed/ambulate, DVT proph w/SCDs





Stable currently


Increase IVF's 100ml/h


Renal dosing of medications


OOB with PT


Continue Cefepime and hold Vancomycin


Consult Urology service


AM lab: CMP, CBC

## 2020-03-16 LAB
ALBUMIN SERPL BCG-MCNC: 2.5 G/DL (ref 3.4–4.8)
ALP SERPL-CCNC: 196 U/L (ref 40–110)
ALT SERPL W P-5'-P-CCNC: 7 U/L (ref 8–55)
ANION GAP SERPL CALC-SCNC: 18 MMOL/L (ref 10–20)
APTT PPP: 52.9 SEC (ref 22.9–36.1)
AST SERPL-CCNC: 10 U/L (ref 5–34)
BILIRUB SERPL-MCNC: 0.3 MG/DL (ref 0.2–1.2)
BUN SERPL-MCNC: 106 MG/DL (ref 8.4–25.7)
CALCIUM SERPL-MCNC: 8.3 MG/DL (ref 7.8–10.44)
CHLORIDE SERPL-SCNC: 106 MMOL/L (ref 98–107)
CO2 SERPL-SCNC: 18 MMOL/L (ref 23–31)
CREAT CL PREDICTED SERPL C-G-VRATE: 8 ML/MIN (ref 70–130)
GLOBULIN SER CALC-MCNC: 3.7 G/DL (ref 2.4–3.5)
GLUCOSE SERPL-MCNC: 114 MG/DL (ref 83–110)
HGB BLD-MCNC: 7.8 G/DL (ref 14–18)
INR PPP: 1.3
MCH RBC QN AUTO: 28.8 PG (ref 27–31)
MCV RBC AUTO: 87.7 FL (ref 78–98)
MDIFF COMPLETE?: YES
PLATELET # BLD AUTO: 225 THOU/UL (ref 130–400)
POTASSIUM SERPL-SCNC: 4.7 MMOL/L (ref 3.5–5.1)
PROTHROMBIN TIME: 16.4 SEC (ref 12–14.7)
RBC # BLD AUTO: 2.7 MILL/UL (ref 4.7–6.1)
SODIUM SERPL-SCNC: 137 MMOL/L (ref 136–145)
VANCOMYCIN SERPL-MCNC: 11.6 UG/ML
WBC # BLD AUTO: 10.5 THOU/UL (ref 4.8–10.8)

## 2020-03-16 PROCEDURE — BT121ZZ FLUOROSCOPY OF LEFT KIDNEY USING LOW OSMOLAR CONTRAST: ICD-10-PCS | Performed by: RADIOLOGY

## 2020-03-16 PROCEDURE — 0TP98DZ REMOVAL OF INTRALUMINAL DEVICE FROM URETER, VIA NATURAL OR ARTIFICIAL OPENING ENDOSCOPIC: ICD-10-PCS | Performed by: RADIOLOGY

## 2020-03-16 PROCEDURE — 0T778DZ DILATION OF LEFT URETER WITH INTRALUMINAL DEVICE, VIA NATURAL OR ARTIFICIAL OPENING ENDOSCOPIC: ICD-10-PCS | Performed by: RADIOLOGY

## 2020-03-16 RX ADMIN — HYDROCODONE BITARTRATE AND ACETAMINOPHEN PRN TAB: 5; 325 TABLET ORAL at 05:10

## 2020-03-16 RX ADMIN — HYDROCODONE BITARTRATE AND ACETAMINOPHEN PRN TAB: 5; 325 TABLET ORAL at 15:00

## 2020-03-16 RX ADMIN — Medication SCH: at 10:01

## 2020-03-16 RX ADMIN — SODIUM BICARBONATE SCH: 325 TABLET ORAL at 10:01

## 2020-03-16 RX ADMIN — MOMETASONE FUROATE AND FORMOTEROL FUMARATE DIHYDRATE SCH PUFF: 200; 5 AEROSOL RESPIRATORY (INHALATION) at 07:22

## 2020-03-16 RX ADMIN — VANCOMYCIN HYDROCHLORIDE SCH: 1 INJECTION, SOLUTION INTRAVENOUS at 12:35

## 2020-03-16 RX ADMIN — SODIUM BICARBONATE SCH MG: 325 TABLET ORAL at 15:00

## 2020-03-16 RX ADMIN — DOCUSATE SODIUM 50 MG AND SENNOSIDES 8.6 MG SCH: 8.6; 5 TABLET, FILM COATED ORAL at 10:02

## 2020-03-16 RX ADMIN — MOMETASONE FUROATE AND FORMOTEROL FUMARATE DIHYDRATE SCH PUFF: 200; 5 AEROSOL RESPIRATORY (INHALATION) at 18:33

## 2020-03-16 RX ADMIN — SODIUM BICARBONATE SCH MG: 325 TABLET ORAL at 21:06

## 2020-03-16 NOTE — PDOC.HOSPP
- Subjective


Encounter Date: 03/16/20


Encounter Time: 17:55


Subjective: 





f/u for AMRIT, urinary retention and placement of new R nephrostomy tube and 

exchange of L PNC. Remains sleepy after receiving pain meds post-procedure.





- Objective


Vital Signs & Weight: 


 Vital Signs (12 hours)











  Temp Pulse Resp BP BP Pulse Ox


 


 03/16/20 12:00  98.1 F  85  16   145/66 H  96


 


 03/16/20 09:21   76   154/65 H  


 


 03/16/20 08:00  98.2 F  76  18   154/65 H 








 Weight











Admit Weight                   138 lb 11.2 oz


 


Weight                         138 lb 11.2 oz














I&O: 


 











 03/15/20 03/16/20 03/17/20





 06:59 06:59 06:59


 


Intake Total 1400 2285 


 


Output Total  1700 


 


Balance 1400 585 











Result Diagrams: 


 03/16/20 05:24





 03/16/20 05:24


Additional Labs: 





Microbiology





03/14/20 04:27   Nasal swab   Influenza Types A,B Direct EIA - Final


03/14/20 04:35   Venous blood - Left Arm   Blood Culture - Preliminary


                              Specimen has been received and culture in 

progress.


                              No Growth to date.


03/14/20 04:27   Urine Nephrostomy tube   Urine Culture - Preliminary


03/14/20 04:19   Venous blood - Left Arm   Blood Culture - Preliminary


                              Staphylococcus aureus





 Laboratory Tests











  03/14/20 03/14/20 03/15/20





  04:19 04:19 04:37


 


WBC  14.5 H  


 


Hgb  8.6 L  


 


Neutrophils %  87.1 H  


 


Neutrophils % (Manual)   


 


Sodium   132 L 


 


BUN   93 H 


 


Creatinine   5.78 H 


 


Random Vancomycin    13.5














  03/15/20





  05:45


 


WBC 


 


Hgb 


 


Neutrophils % 


 


Neutrophils % (Manual)  82 H


 


Sodium 


 


BUN 


 


Creatinine 


 


Random Vancomycin 














Hospitalist ROS





- Medication


Medications: 


Active Medications











Generic Name Dose Route Start Last Admin





  Trade Name Freq  PRN Reason Stop Dose Admin


 


Acetaminophen  1,000 mg  03/14/20 09:45  03/15/20 08:24





  Tylenol  PO   1,000 mg





  Q6H PRN   Administration





  Mild Pain (1-3)   





     





     





     


 


Hydrocodone Bitart/Acetaminophen  1 tab  03/14/20 09:45  03/16/20 15:00





  [place] 5/325  PO   1 tab





  Q6H PRN   Administration





  Pain   





     





     





     


 


Amlodipine Besylate  5 mg  03/15/20 09:00  03/16/20 09:21





  Norvasc  PO   5 mg





  DAILY PREET   Administration





     





     





     





     


 


Aspirin  81 mg  03/15/20 09:00  03/15/20 08:23





  Ecotrin  PO   81 mg





  DAILY PREET   Administration





     





     





     





     


 


Bisacodyl  10 mg  03/14/20 10:07  03/15/20 08:26





  Dulcolax  IA   10 mg





  Q8H PRN   Administration





  Constipation   





     





     





     


 


Famotidine  20 mg  03/14/20 21:00  03/16/20 10:02





  Pepcid  PO   Not Given





  BID PREET   





     





     





     





     


 


Gabapentin  100 mg  03/14/20 21:00  03/15/20 20:24





  Neurontin  PO   100 mg





  HS PREET   Administration





     





     





     





     


 


Cefepime HCl 1 gm/ Sodium  100 mls @ 200 mls/hr  03/14/20 21:00  03/16/20 08:36





  Chloride  IVPB   100 mls





  Q12HR PREET   Administration





     





     





     





     


 


Sodium Chloride  1,000 mls @ 125 mls/hr  03/15/20 12:29  03/16/20 17:45





  Normal Saline 0.9%  IV   1,000 mls





  .Q8H PREET   Administration





     





     





     





     


 


Mometasone Furoate/Formoterol Fumar  2 puff  03/14/20 18:30  03/16/20 07:22





  Dulera 200 Mcg/5 Mcg Inhaler  INH   2 puff





  BID-RT PREET   Administration





     





     





     





     


 


Senna/Docusate Sodium  2 tab  03/15/20 09:00  03/16/20 10:02





  Senokot S  PO   Not Given





  DAILY PREET   





     





     





     





     


 


Sodium Bicarbonate  975 mg  03/15/20 15:00  03/16/20 15:00





  Bicarbonate, Sodium  PO   975 mg





  TID PREET   Administration





     





     





     





     


 


Sodium Chloride  10 ml  03/14/20 21:00  03/16/20 10:01





  Flush - Normal Saline  IVF   Not Given





  Q12HR PREET   





     





     





     





     


 


Tamsulosin HCl  0.4 mg  03/14/20 21:00  03/16/20 10:01





  Flomax  PO   Not Given





  BID PREET   





     





     





     





     














- Exam


General - other findings: sleepy, arousable briefly


Eye: PERRL, anicteric sclera


ENT: normocephalic atraumatic, no oropharyngeal lesions


Neck: supple, symmetric, no JVD, no thyromegaly


Heart: RRR, no gallops, no rubs, normal peripheral pulses


Heart - other findings: S1, S2


Respiratory: CTAB, no wheezes, no rales, no ronchi, normal chest expansion


Gastrointestinal: soft, non-distended, normal bowel sounds, no palpable masses


Gastrointestinal - other findings: bilat PCN in place, gross hematuria in R PCN 

bag


Extremities: no cyanosis, no clubbing


Skin: normal turgor, no lesions


Musculoskeletal: normal tone, generalized weakness


Psychiatric: somnolent, lethargic





Hosp A/P


(1) Sepsis due to Staphylococcus aureus


Code(s): A41.01 - SEPSIS DUE TO METHICILLIN SUSCEPTIBLE STAPHYLOCOCCUS AUREUS   

Status: Acute   


Plan: 


Secondary to UTI with staph spp, continue Cefepime, continue IVF's








(2) Acute kidney injury superimposed on CKD


Code(s): N17.9 - ACUTE KIDNEY FAILURE, UNSPECIFIED; N18.9 - CHRONIC KIDNEY 

DISEASE, UNSPECIFIED   Status: Acute   


Plan: 


Worsening renal function, s/p R nephrostomy placement and exchange of L 

nephrostomy tube today








(3) Acute on chronic anemia


Code(s): D64.9 - ANEMIA, UNSPECIFIED   Status: Acute   


Plan: 


Serial monitoring, CBC in am








(4) Constipation


Code(s): K59.00 - CONSTIPATION, UNSPECIFIED   Status: Acute   


Qualifiers: 


   Constipation type: slow transit constipation   Qualified Code(s): K59.01 - 

Slow transit constipation   





(5) Physical deconditioning


Code(s): R53.81 - OTHER MALAISE   Status: Chronic   





(6) Moderate protein-calorie malnutrition


Code(s): E44.0 - MODERATE PROTEIN-CALORIE MALNUTRITION   Status: Chronic   





- Plan


plan discussed w/ family, continue antibiotics, PT/OT, , out of 

bed/ambulate, DVT proph w/SCDs





Stable currently


Increase IVF's 100ml/h


Renal dosing of medications


OOB with PT


Continue Cefepime/ d/c Vancomycin


Urology consult appreciated


AM lab: BMP, CBC

## 2020-03-16 NOTE — SPC
Fluoroscopic guided left percutaneous nephrostomy catheter exchange with nephrostogram

Sonographic guided right percutaneous nephrostomy catheter placement with nephrostogram

Conscious sedation: At least 30 minutes spent with the patient for conscious sedation.



HISTORY: Prostate cancer. Bilateral ureteral obstruction.



FINDINGS: After explaining the procedure and answering all questions, the back was prepped and draped
 in usual sterile fashion, including the external portions of the left percutaneous nephrostomy

catheter. Small amount of contrast was injected within the left PCN, opacifying a mildly dilated left
 renal collecting system and ureter. Ureteral stent is in place, although no contrast was seen to

extend into the bladder.



Sterile technique was used to carefully place a 0.035 Bentson wire and exchange the left PCN for a ne
w 10 [place] locking loop nephrostomy catheter. Good position confirmed. Catheter was secured

externally with 0 silk suture and left draining to gravity.



Fluoroscopy time 1.9 minutes.



Sterile technique, buffered local anesthesia, conscious sedation, sonographic guidance, and a right f
lank approach were used to carefully advance a 21-gauge AccuStick needle into the dilated

collecting system of the right kidney. Due to patient movement and noncooperation, a dilated superior
 pole calyx was initially accessed. With noncooperation by the patient, a second access was not

attempted. Standard AccuStick technique was used at 0.035 J-wire. An 8 [place] locking loop percutaneo
us nephrostomy catheter was then placed into the dilated renal pelvis. Position confirmed with

contrast material. Catheter was secured externally with 0 silk suture and left draining to gravity.



Patient tolerated the procedure well and was returned in unchanged condition.











IMPRESSION: Technically successful right PCN placement

Technically successful left PCN replacement.



Bilateral distal ureteral obstruction.



Reported By: HECTOR Miranda 

Electronically Signed:  3/16/2020 11:50 AM

## 2020-03-16 NOTE — PRG
DATE OF SERVICE:  03/16/2020



SUBJECTIVE:  Patient was seen and examined at bedside and overnight events noted. 



Patient denies any shortness of breath or chest pain or palpitation. 



No history of nausea or vomiting or diarrhea or fever or chills or cramps.



OBJECTIVE:  GENERAL:  This is a well-built male, in no acute distress. 

VITAL SIGNS:  Temperature 98.1.  Heart rate 85.  Respiratory rate 16.  Blood

pressure 145/66. 

HEENT:  Atraumatic, normocephalic.  Oral mucosa is moist 

NECK:  Supple. 

CARDIOVASCULAR:  S1, S2 heard.  Rate and rhythm regular. 

RESPIRATORY:  Clear to auscultation. 

GASTROINTESTINAL:  Abdomen is soft. 

MUSCULOSKELETAL:  No tenderness.  No edema. 

DERMATOLOGIC:  No skin rash. 

NEUROLOGIC:  Alert and awake and oriented X3.  No focal neurologic deficits. Moving

all the extremities. 

PSYCHIATRIC:  Mood and affect normal.



LABORATORY DATA:  Potassium 4.7, BUN is 106, and creatinine is 6.5.



ASSESSMENT AND PLAN:  

1. Acute kidney injury on chronic kidney disease, stage 3, getting worse most likely

from obstruction, follow Urology. 

2. Obstructive uropathy.

3. Hypertension.

4. Anemia of chronic disease.

5. Metabolic acidosis. 

Agree with nephrostomy tube and follow up with Urology.  Avoid nephrotoxins and

follow. 





Job ID:  [id number]

## 2020-03-16 NOTE — PRG
DATE OF SERVICE:  03/16/2020



SUBJECTIVE:  He feels better than admission, but still weak.  Denies any pain.



OBJECTIVE:  VITAL SIGNS:  Temperature 98.2, blood pressure 154/65, and pulse 
76. 

GENERAL:  Awake and alert, in no distress at this time. 

ABDOMEN:  Soft and nontender.



LABORATORY DATA:  Creatinine 6.5.



IMPRESSION:  Mr. Mckeon has a prior history of radiation therapy for prostate 
cancer.

He then presented to the hospital in renal failure in 08/2019, and had a left

ureteral stent placed.  Obstruction of the left ureter was from transitional 
cell

carcinoma, which was resected and revealed high-grade transitional cell 
carcinoma

with muscle invasion.  He eventually required a left nephrostomy tube placement 
and

antegrade left ureteral stent placement.  His renal function stabilized at a 
creatinine

approximately 2.  All right-sided urine was refluxing up the left stent and out

through the nephrostomy tube and he therefore was not voiding.  He began

chemotherapy for his muscle invasive transitional cell carcinoma but was only 
able

to complete 3 of the 4 cycles due to poor tolerance. 



He presented to the hospital during this admission on 03/14/2020, with fevers.

Blood and urine cultures have grown Staph.  He is afebrile at this time.  His 
kidney

function had also declined significantly and he was admitted with a creatinine 
of

5.78, and today's creatinine is 6.5.  I have discussed placement of a right

nephrostomy tube as it appears that he is no longer draining right kidney urine 
into

the bladder and he is not responding to hydration.  He is going downstairs at 
this

time for right nephrostomy tube placement.  I have also asked them to exchange 
the

left nephrostomy tube.  He is scheduled to discuss radical cystectomy at [facility]

[facility] in [place], and has an appointment for discussion in early 
April.  He

is quite deconditioned at this time, unless he improves, I doubt he will 
tolerate

the surgical therapy.  At this time, we plan to see how his renal function 
improves

with bilateral percutaneous nephrostomy tubes. 







Job ID:  [id number]



[facility]

## 2020-03-17 LAB
ANION GAP SERPL CALC-SCNC: 20 MMOL/L (ref 10–20)
BUN SERPL-MCNC: 98 MG/DL (ref 8.4–25.7)
CALCIUM SERPL-MCNC: 8.5 MG/DL (ref 7.8–10.44)
CHLORIDE SERPL-SCNC: 112 MMOL/L (ref 98–107)
CO2 SERPL-SCNC: 15 MMOL/L (ref 23–31)
CREAT CL PREDICTED SERPL C-G-VRATE: 9 ML/MIN (ref 70–130)
GLUCOSE SERPL-MCNC: 86 MG/DL (ref 83–110)
HBSAG INDEX: 0.27 S/CO (ref 0–0.99)
HGB BLD-MCNC: 7.8 G/DL (ref 14–18)
MCH RBC QN AUTO: 28.3 PG (ref 27–31)
MCV RBC AUTO: 88.9 FL (ref 78–98)
MDIFF COMPLETE?: YES
PLATELET # BLD AUTO: 242 THOU/UL (ref 130–400)
POLYCHROMASIA BLD QL SMEAR: (no result) (100X)
POTASSIUM SERPL-SCNC: 4.9 MMOL/L (ref 3.5–5.1)
RBC # BLD AUTO: 2.76 MILL/UL (ref 4.7–6.1)
SODIUM SERPL-SCNC: 142 MMOL/L (ref 136–145)
WBC # BLD AUTO: 9.8 THOU/UL (ref 4.8–10.8)

## 2020-03-17 RX ADMIN — DOCUSATE SODIUM 50 MG AND SENNOSIDES 8.6 MG SCH: 8.6; 5 TABLET, FILM COATED ORAL at 11:10

## 2020-03-17 RX ADMIN — SODIUM BICARBONATE SCH: 325 TABLET ORAL at 22:11

## 2020-03-17 RX ADMIN — Medication SCH: at 11:11

## 2020-03-17 RX ADMIN — Medication SCH: at 22:11

## 2020-03-17 RX ADMIN — MOMETASONE FUROATE AND FORMOTEROL FUMARATE DIHYDRATE SCH: 200; 5 AEROSOL RESPIRATORY (INHALATION) at 19:55

## 2020-03-17 RX ADMIN — Medication SCH: at 06:27

## 2020-03-17 RX ADMIN — MOMETASONE FUROATE AND FORMOTEROL FUMARATE DIHYDRATE SCH: 200; 5 AEROSOL RESPIRATORY (INHALATION) at 07:48

## 2020-03-17 RX ADMIN — SODIUM BICARBONATE SCH: 325 TABLET ORAL at 15:46

## 2020-03-17 RX ADMIN — SODIUM BICARBONATE SCH: 325 TABLET ORAL at 11:10

## 2020-03-17 NOTE — PRG
DATE OF SERVICE:  03/17/2020



SUBJECTIVE:  This is a well-built male, who is very confused and lethargic.



OBJECTIVE:  GENERAL:  This is a well-built white male, lethargic and confused. 

VITAL SIGNS:  Temperature 97.5, pulse 94, respiratory rate 18, blood pressure

177/68. 

HEENT:  Atraumatic and normocephalic. 

NECK:  Supple. 

CV:  S1 and S2 heard.  Rate and rhythm regular. 

RESPIRATORY:  Clear. 

GI:  Abdomen is soft. 

MUSCULOSKELETAL:  No edema. 

DERMATOLOGIC:  No skin rash. 

NEUROLOGIC:  Lethargic.



LABORATORY DATA:  Hemoglobin is 7.8.  Potassium is 4.9, BUN is 98, creatinine is 6.2.



ASSESSMENT AND PLAN:  

1. Acute kidney injury on chronic kidney, stage 3, with labs getting better, but the

patient is a kind of uremic.  He is having altered mentation, very lethargic, and

sleepy.  Plan is to have a session of dialysis or 2 for renal clearance. 

2. Altered mentation, it could be uremia or medications or bacteremia.  Plan is to

have dialysis to have renal clearance of the toxin. 

3. History of hypertension.

4. Anemia of chronic disease.

5. Obstructive uropathy.

6. Metabolic acidosis. 



The patient is started making urine after nephrostomy, but remains confused, and

plan is to have a session of dialysis.  We will follow. 







Job ID:  [id number]

## 2020-03-17 NOTE — CON
DATE OF CONSULTATION:  



HISTORY OF PRESENT ILLNESS:  Bandar Mckeon is a 79-year-old male patient with

bladder tumors, recurrent.  He has suffered urinary obstruction as a result, he has

required stenting.  He has had a nephrostomy tube placed in the past.  He is

followed by Dr. Vu and Dr. Natalio Ivy.  I have been asked to see him

regarding placement of a temporary femoral dialysis catheter as he has suffered

acute renal failure superimposed on his chronic kidney disease.  He is not a

diabetic.  The patient has become confused and probably will need short-term

dialysis. 



ALLERGIES:  NONE.



MEDICATIONS:  

1. Colace.

2. Furosemide.

3. Gabapentin.

4. Nexium.

5. Aspirin.

6. Felodipine.

7. Hydrocodone.

8. Flomax.



PAST MEDICAL HISTORY:  Right inguinal hernia repair in the past, transurethral

resection of bladder tumor, will need a cystectomy in the future.  History of

prostate cancer.  History of tobacco abuse, none currently.  Hypertension. 



PAST SURGICAL HISTORY:  Right inguinal hernia repair, transurethral resection of

bladder tumor, urinary stents placed, nephrostomy tube placed in the past, MediPort

placed. 



PHYSICAL EXAMINATION:

VITAL SIGNS:  Height is 5 feet and 7 inches, weight 137 pounds, and BMI 21.  97.5,

94, 177/68. 

LUNGS:  Clear to auscultation. 

CARDIAC:  Regular rate and rhythm without murmur or gallop. 

ABDOMEN:  Soft, nontender. 

EXTREMITIES:  Unremarkable.  No ankle edema.  Palpable radial pulses. 

NEUROLOGIC:  The patient is confused and not cooperative.



LABORATORY DATA:  Hemoglobin 7.8, white count 9.8.  GFR 9, glucose 86, BUN and

creatinine are 98 and 6.2, sodium 142, potassium 4.9. 



ASSESSMENT AND PLAN:  Acute renal failure superimposed on chronic kidney disease.

He started to have increased urine output and it is hoped that his kidney function

resumed and he will not need permanent dialysis.  We will plan on placement of

temporary femoral vein dialysis catheter.  We will protect veins in case for future

dialysis access as he will probably likely need at some point.  Ultrasound vein

mapping has been obtained.  The cephalic vein in both arms is poor in caliber.  It

is very likely, he may need a prosthetic graft when it comes to dialysis access. 







Job ID:  [id number]

## 2020-03-17 NOTE — PDOC.EVN
Event Note





- Event Note


Event Note: 





Patient back from HD, 400 mls off, dropped oxygen sat 88%, RR 20-22, nurse 

placed on 2L NC, sp02 up to 98%, blood pressure stable, afebrile. Placed in 

restraints secondary to pulling at medical lines. Spoke with Dr. Ruth, d/cd IVF

, restraints appropriate for tonight.

## 2020-03-17 NOTE — ULT
BILATERAL UPPER EXTREMITY VENOUS DOPPLER ULTRASOUND FOR DIALYSIS ACCESS VEIN MAPPING:

 

HISTORY: 

End stage renal disease.

 

FINDINGS: 

 

RIGHT UPPER EXTREMITY:

The right cephalic vein measures 1.5 mm in the proximal arm, 1.8 mm in the mid arm, 1.4 mm in the dis
kevin arm, 1.7 mm in the cubital fossa, 2.1 mm in the proximal forearm, and 1.4 mm in the mid and dista
l forearm.

 

The right basilic vein measures 3.3 mm in the proximal arm, 1.9 mm in the mid arm, 1.8 mm in the dist
al arm, 1.9 mm in the proximal forearm, 1 mm in the mid forearm, and 1.2 mm in the distal forearm.  T
here is a clot in the right basilic vein at the level of the antecubital fossa.

 

The right brachial artery measures 4.9 mm, radial artery 1.6 mm, and ulnar artery 1.7 mm.

 

LEFT UPPER EXTREMITY:

The left basilic vein measures 5.5 mm in the proximal arm, 1.9 mm in the mid arm, 1.6 mm in the dista
l arm, 1.4 mm in the cubital fossa, 1.4 mm in the proximal forearm, 1.1 mm in the mid forearm, and 0.
5 mm in the distal forearm.

 

The left cephalic vein measures 1.2 mm in the proximal and mid arm and 2.2 mm in the proximal forearm
.  There is clot in the left cephalic vein at the level of the distal arm and the cubital fossa and t
he vein is too small to measure in the proximal and distal forearm.

 

The left brachial artery measures 5.2 mm, radial artery 1.7 mm, and ulnar artery 1.3 mm.

 

POS: SAMANTA

## 2020-03-17 NOTE — PDOC.HOSPP
- Subjective


Encounter Date: 03/17/20


Encounter Time: 11:15


Subjective: 





f/u for AMRIT/CKD s/p R PCN and exchange of L PCN. Renal function marginally 

improved but UOP better. Remains lethargic, confused and unable to take po meds/

food. 





- Objective


Vital Signs & Weight: 


 Vital Signs (12 hours)











  Temp Pulse Resp BP Pulse Ox


 


 03/17/20 11:10   94   


 


 03/17/20 08:25  97.5 F L  94  18  177/68 H  98


 


 03/17/20 08:00      96


 


 03/17/20 07:48   94  16   96


 


 03/17/20 04:00  98.2 F  94  20  177/73 H  96


 


 03/17/20 00:00  97.8 F  94  20  179/69 H  96








 Weight











Admit Weight                   138 lb 11.2 oz


 


Weight                         138 lb 11.2 oz














I&O: 


 











 03/16/20 03/17/20 03/18/20





 06:59 06:59 06:59


 


Intake Total 2285  100


 


Output Total 1700  1500


 


Balance 585  -1400











Result Diagrams: 


 03/17/20 05:25





 03/17/20 05:25


Additional Labs: 





Microbiology





03/14/20 04:27   Nasal swab   Influenza Types A,B Direct EIA - Final


03/14/20 04:35   Venous blood - Left Arm   Blood Culture - Preliminary


                              Specimen has been received and culture in 

progress.


                              No Growth to date.


03/14/20 04:27   Urine Nephrostomy tube   Urine Culture - Preliminary


03/14/20 04:19   Venous blood - Left Arm   Blood Culture - Preliminary


                              Staphylococcus aureus





 Laboratory Tests











  03/14/20 03/14/20 03/15/20





  04:19 04:19 04:37


 


WBC  14.5 H  


 


Hgb  8.6 L  


 


Neutrophils %  87.1 H  


 


Neutrophils % (Manual)   


 


Sodium   132 L 


 


Carbon Dioxide   


 


BUN   93 H 


 


Creatinine   5.78 H 


 


Random Vancomycin    13.5














  03/15/20 03/16/20 03/16/20





  05:45 05:24 05:24


 


WBC    10.5


 


Hgb    7.8 L


 


Neutrophils %   


 


Neutrophils % (Manual)  82 H   78 H


 


Sodium   


 


Carbon Dioxide   18 L 


 


BUN   106 H 


 


Creatinine   6.52 H 


 


Random Vancomycin   














  03/17/20





  05:25


 


WBC 


 


Hgb 


 


Neutrophils % 


 


Neutrophils % (Manual)  82 H


 


Sodium 


 


Carbon Dioxide 


 


BUN 


 


Creatinine 


 


Random Vancomycin 














Hospitalist ROS





- Medication


Medications: 


Active Medications











Generic Name Dose Route Start Last Admin





  Trade Name Freq  PRN Reason Stop Dose Admin


 


Acetaminophen  1,000 mg  03/14/20 09:45  03/15/20 08:24





  Tylenol  PO   1,000 mg





  Q6H PRN   Administration





  Mild Pain (1-3)   





     





     





     


 


Hydrocodone Bitart/Acetaminophen  1 tab  03/14/20 09:45  03/16/20 15:00





  [place] 5/325  PO   1 tab





  Q6H PRN   Administration





  Pain   





     





     





     


 


Amlodipine Besylate  5 mg  03/15/20 09:00  03/17/20 11:10





  Norvasc  PO   Not Given





  DAILY PREET   





     





     





     





     


 


Aspirin  81 mg  03/15/20 09:00  03/15/20 08:23





  Ecotrin  PO   81 mg





  DAILY PREET   Administration





     





     





     





     


 


Bisacodyl  10 mg  03/14/20 10:07  03/15/20 08:26





  Dulcolax  [place]   10 mg





  Q8H PRN   Administration





  Constipation   





     





     





     


 


Gabapentin  100 mg  03/14/20 21:00  03/16/20 21:07





  Neurontin  PO   Not Given





  HS PREET   





     





     





     





     


 


Sodium Chloride  1,000 mls @ 125 mls/hr  03/15/20 12:29  03/17/20 06:30





  Normal Saline 0.9%  IV   1,000 mls





  .Q8H PREET   Administration





     





     





     





     


 


Mometasone Furoate/Formoterol Fumar  2 puff  03/14/20 18:30  03/17/20 07:48





  Dulera 200 Mcg/5 Mcg Inhaler  INH   Not Given





  BID-RT PREET   





     





     





     





     


 


Senna/Docusate Sodium  2 tab  03/15/20 09:00  03/17/20 11:10





  Senokot S  PO   Not Given





  DAILY [facility]   





     





     





     





     


 


Sodium Bicarbonate  975 mg  03/15/20 15:00  03/17/20 11:10





  Bicarbonate, Sodium  PO   Not Given





  TID PREET   





     





     





     





     


 


Sodium Chloride  10 ml  03/14/20 21:00  03/17/20 11:11





  Flush - Normal Saline  IVF   Not Given





  Q12HR PREET   





     





     





     





     


 


Tamsulosin HCl  0.4 mg  03/14/20 21:00  03/17/20 11:11





  Flomax  PO   Not Given





  BID PREET   





     





     





     





     














- Exam


General - other findings: lethargic, minimally arouses, occasional agitation


Eye: PERRL


ENT: normocephalic atraumatic, no oropharyngeal lesions


Neck: supple, symmetric, no JVD, no thyromegaly


Heart: RRR, no gallops, no rubs, normal peripheral pulses


Heart - other findings: S1, S2


Respiratory: CTAB, no wheezes, no rales, no ronchi, normal chest expansion


Gastrointestinal: soft, non-tender, non-distended, normal bowel sounds, no 

palpable masses


Extremities: no cyanosis, no clubbing


Skin: normal turgor, no lesions


Musculoskeletal: generalized weakness


Psychiatric: oriented to person, somnolent, lethargic





Hosp A/P


(1) Sepsis due to Staphylococcus aureus


Code(s): A41.01 - SEPSIS DUE TO METHICILLIN SUSCEPTIBLE STAPHYLOCOCCUS AUREUS   

Status: Acute   


Plan: 


Continue Cefepime IV, IVF's








(2) Toxic metabolic encephalopathy


Code(s): G92 - TOXIC ENCEPHALOPATHY   Status: Acute   


Plan: 


Suspected Uremic syndrome given renal failure, likely may benefit from HD








(3) Acute kidney injury superimposed on CKD


Code(s): N17.9 - ACUTE KIDNEY FAILURE, UNSPECIFIED; N18.9 - CHRONIC KIDNEY 

DISEASE, UNSPECIFIED   Status: Acute   


Plan: 


Mild improvement, may need several sessions of HD








(4) Acute on chronic anemia


Code(s): D64.9 - ANEMIA, UNSPECIFIED   Status: Acute   


Plan: 


H/H trend stable currently, continue monitoring, likely due to CKD








(5) Constipation


Code(s): K59.00 - CONSTIPATION, UNSPECIFIED   Status: Acute   


Qualifiers: 


   Constipation type: slow transit constipation   Qualified Code(s): K59.01 - 

Slow transit constipation   





(6) Physical deconditioning


Code(s): R53.81 - OTHER MALAISE   Status: Chronic   





(7) Moderate protein-calorie malnutrition


Code(s): E44.0 - MODERATE PROTEIN-CALORIE MALNUTRITION   Status: Chronic   





- Plan


plan discussed w/ family, continue antibiotics, PT/OT, , DVT 

proph w/SCDs





Stable currently


IVF's 100ml/h


Renal dosing of medications


OOB with PT


Continue Cefepime/ d/c Vancomycin


Urology consult appreciated


HD today after HD catheter placement


AM lab: BMP, CBC

## 2020-03-18 LAB
ANION GAP SERPL CALC-SCNC: 18 MMOL/L (ref 10–20)
BUN SERPL-MCNC: 39 MG/DL (ref 8.4–25.7)
CALCIUM SERPL-MCNC: 8.7 MG/DL (ref 7.8–10.44)
CHLORIDE SERPL-SCNC: 106 MMOL/L (ref 98–107)
CO2 SERPL-SCNC: 22 MMOL/L (ref 23–31)
CREAT CL PREDICTED SERPL C-G-VRATE: 17 ML/MIN (ref 70–130)
GLUCOSE SERPL-MCNC: 100 MG/DL (ref 83–110)
HGB BLD-MCNC: 7 G/DL (ref 14–18)
MCH RBC QN AUTO: 29.2 PG (ref 27–31)
MCV RBC AUTO: 87.2 FL (ref 78–98)
MDIFF COMPLETE?: YES
PLATELET # BLD AUTO: 238 THOU/UL (ref 130–400)
POTASSIUM SERPL-SCNC: 3.6 MMOL/L (ref 3.5–5.1)
RBC # BLD AUTO: 2.4 MILL/UL (ref 4.7–6.1)
SODIUM SERPL-SCNC: 142 MMOL/L (ref 136–145)
WBC # BLD AUTO: 8.1 THOU/UL (ref 4.8–10.8)

## 2020-03-18 RX ADMIN — MOMETASONE FUROATE AND FORMOTEROL FUMARATE DIHYDRATE SCH PUFF: 200; 5 AEROSOL RESPIRATORY (INHALATION) at 06:57

## 2020-03-18 RX ADMIN — Medication SCH: at 21:41

## 2020-03-18 RX ADMIN — DOCUSATE SODIUM 50 MG AND SENNOSIDES 8.6 MG SCH: 8.6; 5 TABLET, FILM COATED ORAL at 09:47

## 2020-03-18 RX ADMIN — SODIUM BICARBONATE SCH: 325 TABLET ORAL at 15:05

## 2020-03-18 RX ADMIN — SODIUM BICARBONATE SCH: 325 TABLET ORAL at 21:41

## 2020-03-18 RX ADMIN — Medication SCH ML: at 09:14

## 2020-03-18 RX ADMIN — MOMETASONE FUROATE AND FORMOTEROL FUMARATE DIHYDRATE SCH: 200; 5 AEROSOL RESPIRATORY (INHALATION) at 18:46

## 2020-03-18 RX ADMIN — SODIUM BICARBONATE SCH MG: 325 TABLET ORAL at 09:12

## 2020-03-18 RX ADMIN — DOCUSATE SODIUM 50 MG AND SENNOSIDES 8.6 MG SCH TAB: 8.6; 5 TABLET, FILM COATED ORAL at 09:12

## 2020-03-18 NOTE — PDOC.HOSPP
- Subjective


Encounter Date: 03/18/20


Encounter Time: 12:00


Subjective: 





f/u for encephalopathy and likely uremic syndrome in the context of ESRD 

receiving HD in the last 24h. Still lethargic and restless per nursing/family.





- Objective


Vital Signs & Weight: 


 Vital Signs (12 hours)











  Temp Pulse Resp BP BP Pulse Ox


 


 03/18/20 09:13   99   183/70 H  


 


 03/18/20 08:00  97.9 F  99     100


 


 03/18/20 07:11  97.9 F  99  20   183/70 H  100


 


 03/18/20 04:00  97.9 F  97  20   174/78 H  100








 Weight











Admit Weight                   138 lb 11.2 oz


 


Weight                         144 lb 6.444 oz














I&O: 


 











 03/17/20 03/18/20 03/19/20





 06:59 06:59 06:59


 


Intake Total  100 50


 


Output Total  3675 


 


Balance  -3575 50











Result Diagrams: 


 03/18/20 05:30





 03/18/20 05:30


Additional Labs: 





Microbiology





03/14/20 04:27   Nasal swab   Influenza Types A,B Direct EIA - Final


03/14/20 04:35   Venous blood - Left Arm   Blood Culture - Preliminary


                              Specimen has been received and culture in 

progress.


                              No Growth to date.


03/14/20 04:27   Urine Nephrostomy tube   Urine Culture - Preliminary


03/14/20 04:19   Venous blood - Left Arm   Blood Culture - Preliminary


                              Staphylococcus aureus





 Laboratory Tests











  03/14/20 03/14/20 03/15/20





  04:19 04:19 04:37


 


WBC  14.5 H  


 


Hgb  8.6 L  


 


Neutrophils %  87.1 H  


 


Neutrophils % (Manual)   


 


Sodium   132 L 


 


Carbon Dioxide   


 


BUN   93 H 


 


Creatinine   5.78 H 


 


Random Vancomycin    13.5














  03/15/20 03/16/20 03/16/20





  05:45 05:24 05:24


 


WBC    10.5


 


Hgb    7.8 L


 


Neutrophils %   


 


Neutrophils % (Manual)  82 H   78 H


 


Sodium   


 


Carbon Dioxide   18 L 


 


BUN   106 H 


 


Creatinine   6.52 H 


 


Random Vancomycin   














  03/17/20 03/18/20





  05:25 05:30


 


WBC  9.8 


 


Hgb  7.8 L 


 


Neutrophils %  


 


Neutrophils % (Manual)  82 H  78 H


 


Sodium  


 


Carbon Dioxide  


 


BUN  


 


Creatinine  


 


Random Vancomycin  














Hospitalist ROS





- Medication


Medications: 


Active Medications











Generic Name Dose Route Start Last Admin





  Trade Name Freq  PRN Reason Stop Dose Admin


 


Acetaminophen  1,000 mg  03/14/20 09:45  03/15/20 08:24





  Tylenol  PO   1,000 mg





  Q6H PRN   Administration





  Mild Pain (1-3)   





     





     





     


 


Hydrocodone Bitart/Acetaminophen  1 tab  03/14/20 09:45  03/16/20 15:00





  [place] 5/325  PO   1 tab





  Q6H PRN   Administration





  Pain   





     





     





     


 


Amlodipine Besylate  5 mg  03/15/20 09:00  03/18/20 09:13





  Norvasc  PO   5 mg





  DAILY PREET   Administration





     





     





     





     


 


Aspirin  81 mg  03/15/20 09:00  03/15/20 08:23





  Ecotrin  PO   81 mg





  DAILY PREET   Administration





     





     





     





     


 


Bisacodyl  10 mg  03/14/20 10:07  03/15/20 08:26





  Dulcolax  [place]   10 mg





  Q8H PRN   Administration





  Constipation   





     





     





     


 


Famotidine  20 mg  03/18/20 09:00  03/18/20 09:13





  Pepcid  PO   20 mg





  DAILY PREET   Administration





     





     





     





     


 


Gabapentin  100 mg  03/14/20 21:00  03/17/20 22:11





  Neurontin  PO   Not Given





  HS PREET   





     





     





     





     


 


Cefepime HCl 1 gm/ Sodium  100 mls @ 200 mls/hr  03/17/20 21:00  03/17/20 22:09





  Chloride  IVPB   100 mls





  2100 PREET   Administration





     





     





     





     


 


Mometasone Furoate/Formoterol Fumar  2 puff  03/14/20 18:30  03/18/20 06:57





  Dulera 200 Mcg/5 Mcg Inhaler  INH   2 puff





  BID-RT PREET   Administration





     





     





     





     


 


Senna/Docusate Sodium  2 tab  03/15/20 09:00  03/18/20 09:47





  Senokot S  PO   Not Given





  DAILY [facility]   





     





     





     





     


 


Sodium Bicarbonate  975 mg  03/15/20 15:00  03/18/20 09:12





  Bicarbonate, Sodium  PO   975 mg





  TID PREET   Administration





     





     





     





     


 


Sodium Chloride  10 ml  03/14/20 21:00  03/18/20 09:14





  Flush - Normal Saline  IVF   10 ml





  Q12HR PREET   Administration





     





     





     





     


 


Tamsulosin HCl  0.4 mg  03/14/20 21:00  03/18/20 09:13





  Flomax  PO   0.4 mg





  BID PREET   Administration





     





     





     





     














- Exam


Eye: PERRL, anicteric sclera


ENT: normocephalic atraumatic, no oropharyngeal lesions


Neck: supple, symmetric, no JVD, no thyromegaly


Heart: RRR, no murmur, no gallops, no rubs, normal peripheral pulses


Heart - other findings: S1, S2


Respiratory: CTAB, no wheezes, no rales, no ronchi, normal chest expansion


Gastrointestinal: soft, non-tender, non-distended, normal bowel sounds, no 

palpable masses


Extremities: no cyanosis, no clubbing, no edema


Skin: normal turgor, no lesions


Musculoskeletal: normal tone, generalized weakness


Musculoskeletal - other findings: moves extremities randomly, opens eyes 

briefly to name


Psychiatric: oriented to person





Hosp A/P


(1) Sepsis due to Staphylococcus aureus


Code(s): A41.01 - SEPSIS DUE TO METHICILLIN SUSCEPTIBLE STAPHYLOCOCCUS AUREUS   

Status: Acute   


Plan: 


Continue Cefepime another 24h then consider transition to po option








(2) Toxic metabolic encephalopathy


Code(s): G92 - TOXIC ENCEPHALOPATHY   Status: Acute   


Plan: 


Likely uremic syndrome, continue HD per Renal service, supportive mgmt








(3) Acute kidney injury superimposed on CKD


Code(s): N17.9 - ACUTE KIDNEY FAILURE, UNSPECIFIED; N18.9 - CHRONIC KIDNEY 

DISEASE, UNSPECIFIED   Status: Acute   


Plan: 


HD completed x 1 session, plan for second session 3/19/20, serial creatinine








(4) Acute on chronic anemia


Code(s): D64.9 - ANEMIA, UNSPECIFIED   Status: Acute   


Plan: 


Serial H/H monitoring, may need PRBC's give hematuria








(5) Constipation


Code(s): K59.00 - CONSTIPATION, UNSPECIFIED   Status: Acute   


Qualifiers: 


   Constipation type: slow transit constipation   Qualified Code(s): K59.01 - 

Slow transit constipation   





(6) Physical deconditioning


Code(s): R53.81 - OTHER MALAISE   Status: Chronic   





(7) Moderate protein-calorie malnutrition


Code(s): E44.0 - MODERATE PROTEIN-CALORIE MALNUTRITION   Status: Chronic   


Plan: 


Ensure BID as tolerated








- Plan


plan discussed w/ family, continue antibiotics, PT/OT, , DVT 

proph w/SCDs





Stable currently


Saline lock IVF's


Renal dosing of medications


OOB with PT


Continue Cefepime/ d/c Vancomycin


Urology consult appreciated


HD today after HD catheter placement


AM lab: BMP, CBC

## 2020-03-18 NOTE — OP
1. Will treat the contusion with cold packs to the areas of pain; use Aleve, 2 tablets, every 12 hours for the pain and inflammation.  2. Followup with Neurology as planned.  3. Continue all regular med.   DATE OF PROCEDURE:  03/17/2020



DIAGNOSIS:  Acute renal failure superimposed on chronic kidney disease with

obstructive uropathy secondary to bladder cancer with bilateral nephrostomies. 



PROCEDURE PERFORMED:  Right femoral vein Trialysis catheter.



ANESTHESIA:  1% Xylocaine.



DESCRIPTION OF PROCEDURE:  With the patient at bedside, right groin was clipped of

hair, prepared with ChloraPrep and draped in routine fashion.  Seldinger technique

used to place a triple-lumen catheter, removing the J-wire, securing the catheter

with 2 interrupted suture of 3-0 nylon.  Sterile dressings applied.  Each port

aspirated of blood, flushed with heparinized saline solution.  The patient tolerated

the procedure well. 







Job ID:  [id number]

## 2020-03-18 NOTE — PRG
DATE OF SERVICE:  03/18/2020



SUBJECTIVE:  Patient was seen and examined at bedside and overnight events 
noted. 



wife at bedside and pt remain confused.



OBJECTIVE:  GENERAL:  This is a well-built male, in no acute distress. 

VITAL SIGNS:  Temperature 98.3.  Pulse 77.  Respiratory rate 16.  Blood pressure

170/75. 

HEENT:  Atraumatic, normocephalic. 

NECK:  Supple. 

CARDIOVASCULAR:  S1, S2 heard.  Rate and rhythm regular. 

RESPIRATORY:  Clear to auscultation. 

GASTROINTESTINAL:  Abdomen is soft. 

MUSCULOSKELETAL:  No tenderness.  No edema. 

DERMATOLOGIC:  No skin rash. 

NEUROLOGIC:  confused.



LABORATORY DATA:  Potassium 3.6, BUN is 39, and creatinine is 3.2.



ASSESSMENT AND PLAN:  

1. Acute kidney injury on chronic kidney stage 3, better.

2. Altered mentation, not much better, but BUN is much better.

3. Hypertension.

4. Obstructive uropathy.

5. Metabolic acidosis.

6. Monitor labs closely and we will follow.







Job ID:  [id number]



MTDD

## 2020-03-19 LAB
ANION GAP SERPL CALC-SCNC: 20 MMOL/L (ref 10–20)
BUN SERPL-MCNC: 43 MG/DL (ref 8.4–25.7)
CALCIUM SERPL-MCNC: 9 MG/DL (ref 7.8–10.44)
CHLORIDE SERPL-SCNC: 109 MMOL/L (ref 98–107)
CO2 SERPL-SCNC: 21 MMOL/L (ref 23–31)
CREAT CL PREDICTED SERPL C-G-VRATE: 15 ML/MIN (ref 70–130)
GLUCOSE SERPL-MCNC: 125 MG/DL (ref 83–110)
HGB BLD-MCNC: 8.1 G/DL (ref 14–18)
MCH RBC QN AUTO: 28.6 PG (ref 27–31)
MCV RBC AUTO: 87.7 FL (ref 78–98)
MDIFF COMPLETE?: YES
PLATELET # BLD AUTO: 245 THOU/UL (ref 130–400)
POTASSIUM SERPL-SCNC: 3.3 MMOL/L (ref 3.5–5.1)
RBC # BLD AUTO: 2.81 MILL/UL (ref 4.7–6.1)
SODIUM SERPL-SCNC: 147 MMOL/L (ref 136–145)
WBC # BLD AUTO: 9.5 THOU/UL (ref 4.8–10.8)

## 2020-03-19 PROCEDURE — 06HY33Z INSERTION OF INFUSION DEVICE INTO LOWER VEIN, PERCUTANEOUS APPROACH: ICD-10-PCS | Performed by: SURGERY

## 2020-03-19 RX ADMIN — DOCUSATE SODIUM 50 MG AND SENNOSIDES 8.6 MG SCH: 8.6; 5 TABLET, FILM COATED ORAL at 08:47

## 2020-03-19 RX ADMIN — MOMETASONE FUROATE AND FORMOTEROL FUMARATE DIHYDRATE SCH: 200; 5 AEROSOL RESPIRATORY (INHALATION) at 19:00

## 2020-03-19 RX ADMIN — SODIUM BICARBONATE SCH: 325 TABLET ORAL at 08:46

## 2020-03-19 RX ADMIN — ASPIRIN SCH: 81 TABLET ORAL at 10:33

## 2020-03-19 RX ADMIN — MOMETASONE FUROATE AND FORMOTEROL FUMARATE DIHYDRATE SCH: 200; 5 AEROSOL RESPIRATORY (INHALATION) at 07:04

## 2020-03-19 RX ADMIN — Medication SCH ML: at 08:49

## 2020-03-19 RX ADMIN — Medication SCH ML: at 21:02

## 2020-03-19 NOTE — PDOC.HOSPP
- Subjective


Encounter Date: 03/19/20


Encounter Time: 13:55


Subjective: 





f/u for metabolic encephalopathy, AMRIT with one session of HD. 





- Objective


Vital Signs & Weight: 


 Vital Signs (12 hours)











  Temp Pulse Resp BP BP Pulse Ox


 


 03/19/20 11:22  97.7 F  105 H  20   156/84 H  100


 


 03/19/20 08:48   102 H    


 


 03/19/20 08:42   102 H   188/82 H  


 


 03/19/20 08:35       100


 


 03/19/20 07:24  98.3 F  102 H  18   188/82 H  100


 


 03/19/20 04:00  98.1 F  98  16   185/88 H  100








 Weight











Admit Weight                   138 lb 11.2 oz


 


Weight                         139 lb 5.314 oz














I&O: 


 











 03/18/20 03/19/20 03/20/20





 06:59 06:59 06:59


 


Intake Total 100 50 


 


Output Total 3675 3350 


 


Balance -6887 -4722 











Result Diagrams: 


 03/19/20 05:40





 03/19/20 05:40


Additional Labs: 





Microbiology





03/14/20 04:27   Nasal swab   Influenza Types A,B Direct EIA - Final


03/14/20 04:35   Venous blood - Left Arm   Blood Culture - Preliminary


                              Specimen has been received and culture in 

progress.


                              No Growth to date.


03/14/20 04:27   Urine Nephrostomy tube   Urine Culture - Preliminary


03/14/20 04:19   Venous blood - Left Arm   Blood Culture - Preliminary


                              Staphylococcus aureus





 Laboratory Tests











  03/14/20 03/14/20 03/15/20





  04:19 04:19 04:37


 


WBC  14.5 H  


 


Hgb  8.6 L  


 


Neutrophils %  87.1 H  


 


Neutrophils % (Manual)   


 


Sodium   132 L 


 


Carbon Dioxide   


 


BUN   93 H 


 


Creatinine   5.78 H 


 


Random Vancomycin    13.5














  03/15/20 03/16/20 03/16/20





  05:45 05:24 05:24


 


WBC    10.5


 


Hgb    7.8 L


 


Neutrophils %   


 


Neutrophils % (Manual)  82 H   78 H


 


Sodium   


 


Carbon Dioxide   18 L 


 


BUN   106 H 


 


Creatinine   6.52 H 


 


Random Vancomycin   














  03/17/20 03/18/20





  05:25 05:30


 


WBC  9.8 


 


Hgb  7.8 L 


 


Neutrophils %  


 


Neutrophils % (Manual)  82 H  78 H


 


Sodium  


 


Carbon Dioxide  


 


BUN  


 


Creatinine  


 


Random Vancomycin  














Hospitalist ROS





- Medication


Medications: 


Active Medications











Generic Name Dose Route Start Last Admin





  Trade Name Freq  PRN Reason Stop Dose Admin


 


Acetaminophen  1,000 mg  03/14/20 09:45  03/15/20 08:24





  Tylenol  PO   1,000 mg





  Q6H PRN   Administration





  Mild Pain (1-3)   





     





     





     


 


Hydrocodone Bitart/Acetaminophen  1 tab  03/14/20 09:45  03/16/20 15:00





  [place] 5/325  PO   1 tab





  Q6H PRN   Administration





  Pain   





     





     





     


 


Amlodipine Besylate  5 mg  03/15/20 09:00  03/19/20 08:48





  Norvasc  PO   Not Given





  DAILY [facility]   





     





     





     





     


 


Aspirin  81 mg  03/15/20 09:00  03/19/20 10:33





  Ecotrin  PO   Not Given





  DAILY PREET   





     





     





     





     


 


Bisacodyl  10 mg  03/14/20 10:07  03/15/20 08:26





  Dulcolax  [place]   10 mg





  Q8H PRN   Administration





  Constipation   





     





     





     


 


Famotidine  20 mg  03/18/20 09:00  03/19/20 08:48





  Pepcid  PO   Not Given





  DAILY [facility]   





     





     





     





     


 


Gabapentin  100 mg  03/14/20 21:00  03/18/20 21:40





  Neurontin  PO   Not Given





  HS [facility]   





     





     





     





     


 


Hydralazine HCl  10 mg  03/14/20 09:45  03/19/20 08:42





  Apresoline  SLOW IVP   10 mg





  Q4H PRN   Administration





  SBP > 180 and HR < 70   





     





     





     


 


Cefepime HCl 1 gm/ Sodium  100 mls @ 200 mls/hr  03/17/20 21:00  03/18/20 21:07





  Chloride  IVPB   100 mls





  2100 PREET   Administration





     





     





     





     


 


Sodium Chloride  1,000 mls @ 75 mls/hr  03/19/20 09:15  03/19/20 10:26





  1/2 Normal Saline  IV   1,000 mls





  [id number] PREET   Administration





     





     





     





     


 


Potassium Chloride 40 meq/  270 mls @ 67.5 mls/hr  03/19/20 13:00  03/19/20 12:

34





  Sodium Chloride  IVPB  03/19/20 16:59  270 mls





  INF PREET   Administration





     





     





     





     


 


Mometasone Furoate/Formoterol Fumar  2 puff  03/14/20 18:30  03/19/20 07:04





  Dulera 200 Mcg/5 Mcg Inhaler  INH   Not Given





  BID-RT PREET   





     





     





     





     


 


Senna/Docusate Sodium  2 tab  03/15/20 09:00  03/19/20 08:47





  Senokot S  PO   Not Given





  DAILY PREET   





     





     





     





     


 


Sodium Chloride  10 ml  03/14/20 21:00  03/19/20 08:49





  Flush - Normal Saline  IVF   10 ml





  Q12HR PREET   Administration





     





     





     





     


 


Tamsulosin HCl  0.4 mg  03/14/20 21:00  03/19/20 08:49





  Flomax  PO   Not Given





  BID PREET   





     





     





     





     














- Exam


General - other findings: agitated, minimal response to name, moves arms 

randomly


Eye: PERRL, anicteric sclera


ENT: normocephalic atraumatic, no oropharyngeal lesions


Neck: supple, symmetric, no JVD, no thyromegaly


Heart: RRR, no murmur, no gallops, no rubs, normal peripheral pulses


Heart - other findings: S1, S2


Respiratory: CTAB, no wheezes, no rales, no ronchi, normal chest expansion


Gastrointestinal: soft, non-tender, non-distended, normal bowel sounds, no 

palpable masses


Extremities: no cyanosis, no clubbing, no edema


Skin: normal turgor, no lesions


Neurological - other findings: random movements of arms


Musculoskeletal: normal tone, generalized weakness


Psychiatric: somnolent, lethargic





Hosp A/P


(1) Sepsis due to Staphylococcus aureus


Code(s): A41.01 - SEPSIS DUE TO METHICILLIN SUSCEPTIBLE STAPHYLOCOCCUS AUREUS   

Status: Acute   


Plan: 


Continue Cefepime








(2) Toxic metabolic encephalopathy


Code(s): G92 - TOXIC ENCEPHALOPATHY   Status: Acute   


Plan: 


Persistent, likely multifactorial process, check CT brain today, supportive, 

limit psychotropic meds, family for bedside support








(3) Acute kidney injury superimposed on CKD


Code(s): N17.9 - ACUTE KIDNEY FAILURE, UNSPECIFIED; N18.9 - CHRONIC KIDNEY 

DISEASE, UNSPECIFIED   Status: Acute   


Plan: 


Improved, considering another session of HD








(4) Acute on chronic anemia


Code(s): D64.9 - ANEMIA, UNSPECIFIED   Status: Acute   





(5) Constipation


Code(s): K59.00 - CONSTIPATION, UNSPECIFIED   Status: Acute   


Qualifiers: 


   Constipation type: slow transit constipation   Qualified Code(s): K59.01 - 

Slow transit constipation   





(6) Physical deconditioning


Code(s): R53.81 - OTHER MALAISE   Status: Chronic   





(7) Moderate protein-calorie malnutrition


Code(s): E44.0 - MODERATE PROTEIN-CALORIE MALNUTRITION   Status: Chronic   





- Plan


plan discussed w/ family, continue antibiotics, , DVT proph w/

SCDs





Stable currently


Continue IVF's


Renal dosing of medications


OOB with PT


Continue Cefepime/ d/c Vancomycin


Urology consult appreciated


HD today after HD catheter placement


Limit psychotropes


CT brain today


AM lab: BMP, CBC

## 2020-03-19 NOTE — OP
DATE OF PROCEDURE:  03/17/2020



PREOPERATIVE DIAGNOSES:  Bladder cancer, prostate cancer, obstructive uropathy,

chronic kidney disease with superimposed acute renal failure, uremia, confusion. 



POSTOPERATIVE DIAGNOSES:  Bladder cancer, prostate cancer, obstructive uropathy,

chronic kidney disease with superimposed acute renal failure, uremia, confusion. 



PROCEDURE PERFORMED:  Right femoral vein temporary Trialysis catheter.



ANESTHESIA:  1% Xylocaine.



DESCRIPTION OF PROCEDURE:  With the patient at bedside, the right groin was clipped

of hair, prepared with ChloraPrep and draped in routine fashion.  Seldinger

technique was used to place a Trialysis catheter.  J-wire removed.  Catheter secured

with 2 interrupted sutures of 3-0 nylon.  Sterile dressings applied.  Each port

aspirated of blood, flushed with heparinized saline solution. 







Job ID:  [id number]

## 2020-03-19 NOTE — CT
EXAM:

Brain CT scan Without contrast:



HISTORY:

Altered mental status



COMPARISON:

None



FINDINGS:



Atrophy and chronic white matter ischemic change.

No focal mass or midline shift.

No intra or extra-axial hemorrhage.

Sinuses appear clear. There is partial opacification of the left mastoid.



IMPRESSION:

No mass or bleed.

Partial opacification left mastoid.



Reported By: Jamarcus Anthony 

Electronically Signed:  3/19/2020 3:02 PM

## 2020-03-19 NOTE — PRG
DATE OF SERVICE:  03/19/2020



SUBJECTIVE:  The patient opens his eyes, but does not respond to questioning.  
He is

currently in restraints. Spoke with wife Millie, she though he might be a little 
better than

yesterday but not sigincantly



. 

OBJECTIVE:

Vital Signs: bp 128/78  PULSE 88

ABDOMEN:  Soft, nontender.  No palpable masses.  No distention. 

EXTREMITIES:  No edema.



White blood cell count 9.5, hemoglobin 8.1, and hematocrit 24.7.  Creatinine 
3.67,

potassium 3.3.  Cultures; Staph aureus positive.  No changes



ASSESSMENT:  Mr. Mckeon remains encephalopathic.  

CT scan demonstrates no evidence ofmass or metastatic disease.  

His renal function has remained stable after singleround of dialysis

Electrolytes within normal limits and creatinine = 3.6.

No signs of ongoing sepsis

Etiology of encephalopathy unclear at this point. 

Nephrostomy tubes draining well with over 3 L of output total.  



PLAN:

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

No additional urologic recommendations at this time. 







Job ID:  [id number]



MTDD

## 2020-03-19 NOTE — PRG
DATE OF SERVICE:  03/19/2020



SUBJECTIVE:  Patient was seen and examined at bedside and overnight events noted.

Patient denies any shortness of breath or chest pain or palpitation.  No history of

nausea or vomiting or diarrhea or fever or chills or cramps. 



OBJECTIVE:  GENERAL:  This is a well-built male, in no apparent distress. 

VITAL SIGNS:  Temperature 98.3.  Heart Rate 102.  Respiratory rate 18.  Blood

pressure 188/82. 

HEENT:  Atraumatic, normocephalic.  Oral mucosa is moist. 

NECK:  Supple. 

CARDIOVASCULAR:  S1, S2 heard.  Rate and rhythm regular. 

RESPIRATORY:  Clear to auscultation. 

GASTROINTESTINAL:  Abdomen is soft. 

MUSCULOSKELETAL:  No tenderness.  No edema. 

DERMATOLOGIC:  No skin rash. 

NEUROLOGIC:  Confused. 

PSYCHIATRIC:  Mood and affect normal.



LABORATORY DATA:  Potassium is 3.3, BUN is 43, and creatinine is 3.6.



ASSESSMENT AND PLAN:  

1. Acute kidney injury on chronic kidney disease, stage 3, stable.

2. Altered mentation.

3. Obstructive uropathy.  Making urine.

4. Metabolic acidosis.

5. Hypokalemia, replace. 



No acute indication for dialysis today, but if mentation not better, I will consider

dialysis tomorrow.  BUN level is much better. 







Job ID:  [id number]

## 2020-03-20 LAB
ANION GAP SERPL CALC-SCNC: 16 MMOL/L (ref 10–20)
BUN SERPL-MCNC: 45 MG/DL (ref 8.4–25.7)
CALCIUM SERPL-MCNC: 8.9 MG/DL (ref 7.8–10.44)
CHLORIDE SERPL-SCNC: 114 MMOL/L (ref 98–107)
CO2 SERPL-SCNC: 22 MMOL/L (ref 23–31)
CREAT CL PREDICTED SERPL C-G-VRATE: 16 ML/MIN (ref 70–130)
GLUCOSE SERPL-MCNC: 113 MG/DL (ref 83–110)
HGB BLD-MCNC: 8.1 G/DL (ref 14–18)
PLATELET # BLD AUTO: 233 THOU/UL (ref 130–400)
POTASSIUM SERPL-SCNC: 3.1 MMOL/L (ref 3.5–5.1)
SODIUM SERPL-SCNC: 149 MMOL/L (ref 136–145)

## 2020-03-20 RX ADMIN — Medication SCH: at 12:06

## 2020-03-20 RX ADMIN — MOMETASONE FUROATE AND FORMOTEROL FUMARATE DIHYDRATE SCH PUFF: 200; 5 AEROSOL RESPIRATORY (INHALATION) at 19:03

## 2020-03-20 RX ADMIN — Medication SCH: at 22:31

## 2020-03-20 RX ADMIN — MOMETASONE FUROATE AND FORMOTEROL FUMARATE DIHYDRATE SCH PUFF: 200; 5 AEROSOL RESPIRATORY (INHALATION) at 06:52

## 2020-03-20 RX ADMIN — Medication SCH: at 10:08

## 2020-03-20 RX ADMIN — DOCUSATE SODIUM 50 MG AND SENNOSIDES 8.6 MG SCH: 8.6; 5 TABLET, FILM COATED ORAL at 10:08

## 2020-03-20 RX ADMIN — ASPIRIN SCH: 81 TABLET ORAL at 10:08

## 2020-03-20 NOTE — PDOC.HOSPP
- Subjective


Encounter Date: 03/20/20


Encounter Time: 11:10


Subjective: 





f/u for metabolic encephalopathy, AMRIT. More alert this am and speaking a few 

words per wife. No HD today.





- Objective


Vital Signs & Weight: 


 Vital Signs (12 hours)











  Temp Pulse Resp BP Pulse Ox


 


 03/20/20 07:42  98.0 F  95  14  162/82 H  100


 


 03/20/20 06:54      94 L


 


 03/20/20 06:52   84  16   94 L


 


 03/20/20 04:00  97.7 F  95  16  160/83 H  100


 


 03/19/20 23:56  97.7 F  98  16  169/77 H  100








 Weight











Admit Weight                   138 lb 11.2 oz


 


Weight                         139 lb 5.314 oz














I&O: 


 











 03/19/20 03/20/20 03/21/20





 06:59 06:59 06:59


 


Intake Total 50 850 


 


Output Total 3350 2290 


 


Balance -3300 -1440 











Result Diagrams: 


 03/20/20 06:00





 03/20/20 06:00


Additional Labs: 





Microbiology





03/14/20 04:27   Nasal swab   Influenza Types A,B Direct EIA - Final


03/14/20 04:35   Venous blood - Left Arm   Blood Culture - Preliminary


                              Specimen has been received and culture in 

progress.


                              No Growth to date.


03/14/20 04:27   Urine Nephrostomy tube   Urine Culture - Preliminary


03/14/20 04:19   Venous blood - Left Arm   Blood Culture - Preliminary


                              Staphylococcus aureus





 Laboratory Tests











  03/14/20 03/14/20 03/15/20





  04:19 04:19 04:37


 


WBC  14.5 H  


 


Hgb  8.6 L  


 


Neutrophils %  87.1 H  


 


Neutrophils % (Manual)   


 


Sodium   132 L 


 


Carbon Dioxide   


 


BUN   93 H 


 


Creatinine   5.78 H 


 


Random Vancomycin    13.5














  03/15/20 03/16/20 03/16/20





  05:45 05:24 05:24


 


WBC    10.5


 


Hgb    7.8 L


 


Neutrophils %   


 


Neutrophils % (Manual)  82 H   78 H


 


Sodium   


 


Carbon Dioxide   18 L 


 


BUN   106 H 


 


Creatinine   6.52 H 


 


Random Vancomycin   














  03/17/20 03/18/20





  05:25 05:30


 


WBC  9.8 


 


Hgb  7.8 L 


 


Neutrophils %  


 


Neutrophils % (Manual)  82 H  78 H


 


Sodium  


 


Carbon Dioxide  


 


BUN  


 


Creatinine  


 


Random Vancomycin  











Radiology Reviewed by me: Yes (CT brain - negative for acute process, L mastoid 

opacified)





Hospitalist ROS





- Medication


Medications: 


Active Medications











Generic Name Dose Route Start Last Admin





  Trade Name Freq  PRN Reason Stop Dose Admin


 


Acetaminophen  1,000 mg  03/14/20 09:45  03/15/20 08:24





  Tylenol  PO   1,000 mg





  Q6H PRN   Administration





  Mild Pain (1-3)   





     





     





     


 


Hydrocodone Bitart/Acetaminophen  1 tab  03/14/20 09:45  03/16/20 15:00





  [place] 5/325  PO   1 tab





  Q6H PRN   Administration





  Pain   





     





     





     


 


Amlodipine Besylate  5 mg  03/15/20 09:00  03/20/20 11:12





  Norvasc  PO   5 mg





  DAILY PREET   Administration





     





     





     





     


 


Aspirin  81 mg  03/15/20 09:00  03/19/20 10:33





  Ecotrin  PO   Not Given





  DAILY PREET   





     





     





     





     


 


Bisacodyl  10 mg  03/14/20 10:07  03/15/20 08:26





  Dulcolax  AK   10 mg





  Q8H PRN   Administration





  Constipation   





     





     





     


 


Famotidine  20 mg  03/18/20 09:00  03/20/20 10:08





  Pepcid  PO   Not Given





  DAILY PREET   





     





     





     





     


 


Gabapentin  100 mg  03/14/20 21:00  03/19/20 21:16





  Neurontin  PO   100 mg





  HS PREET   Administration





     





     





     





     


 


Hydralazine HCl  10 mg  03/14/20 09:45  03/19/20 08:42





  Apresoline  SLOW IVP   10 mg





  Q4H PRN   Administration





  SBP > 180 and HR < 70   





     





     





     


 


Cefepime HCl 1 gm/ Sodium  100 mls @ 200 mls/hr  03/17/20 21:00  03/19/20 21:02





  Chloride  IVPB   100 mls





  2100 PREET   Administration





     





     





     





     


 


Sodium Chloride  1,000 mls @ 75 mls/hr  03/19/20 09:15  03/19/20 21:17





  1/2 Normal Saline  IV   1,000 mls





  .[id number] PREET   Administration





     





     





     





     


 


Mometasone Furoate/Formoterol Fumar  2 puff  03/14/20 18:30  03/20/20 06:52





  Dulera 200 Mcg/5 Mcg Inhaler  INH   1 puff





  BID-RT PREET   Administration





     





     





     





     


 


Senna/Docusate Sodium  2 tab  03/15/20 09:00  03/20/20 10:08





  Senokot S  PO   Not Given





  DAILY PREET   





     





     





     





     


 


Sodium Chloride  10 ml  03/14/20 21:00  03/20/20 10:08





  Flush - Normal Saline  IVF   Not Given





  Q12HR PREET   





     





     





     





     


 


Tamsulosin HCl  0.4 mg  03/14/20 21:00  03/20/20 10:09





  Flomax  PO   Not Given





  BID PREET   





     





     





     





     














- Exam


General Appearance: awake alert


General - other findings: mumbles a few words


Eye: PERRL, anicteric sclera


ENT: normocephalic atraumatic, no oropharyngeal lesions


Neck: supple, symmetric, no JVD, no thyromegaly


Heart: RRR, no murmur, no gallops, no rubs, normal peripheral pulses


Heart - other findings: S1, S2


Respiratory: CTAB, no wheezes, no rales, no ronchi, normal chest expansion


Gastrointestinal: soft, non-tender, non-distended, normal bowel sounds, no 

palpable masses


Extremities: no cyanosis, no clubbing, no edema


Neurological: no new deficit


Neurological - other findings: moves all extremities, follow some commands


Musculoskeletal: normal tone, generalized weakness


Psychiatric: oriented to person





Hosp A/P


(1) Sepsis due to Staphylococcus aureus


Code(s): A41.01 - SEPSIS DUE TO METHICILLIN SUSCEPTIBLE STAPHYLOCOCCUS AUREUS   

Status: Acute   


Plan: 


Continue Cefepime IV








(2) Toxic metabolic encephalopathy


Code(s): G92 - TOXIC ENCEPHALOPATHY   Status: Acute   


Plan: 


Multifactorial process, improving today, continue supportive mgmt, family at 

bedside for re-orientation and limiting daytime sleeping








(3) Acute kidney injury superimposed on CKD


Code(s): N17.9 - ACUTE KIDNEY FAILURE, UNSPECIFIED; N18.9 - CHRONIC KIDNEY 

DISEASE, UNSPECIFIED   Status: Acute   


Plan: 


Renal function stabilizing currently, continue low-volume IVF's, serial 

creatinine








(4) Acute on chronic anemia


Code(s): D64.9 - ANEMIA, UNSPECIFIED   Status: Acute   





(5) Constipation


Code(s): K59.00 - CONSTIPATION, UNSPECIFIED   Status: Acute   


Qualifiers: 


   Constipation type: slow transit constipation   Qualified Code(s): K59.01 - 

Slow transit constipation   





(6) Physical deconditioning


Code(s): R53.81 - OTHER MALAISE   Status: Chronic   


Plan: 


PT/OT for mobilization








(7) Moderate protein-calorie malnutrition


Code(s): E44.0 - MODERATE PROTEIN-CALORIE MALNUTRITION   Status: Chronic   


Plan: 


Ensure Enlive/Nepro, resume Regular diet when tolerating








- Plan


plan discussed w/ family, continue antibiotics, PT/OT, , speech 

therapy, out of bed/ambulate, DVT proph w/SCDs





Stable currently


Continue IVF's


Renal dosing of medications


OOB with PT


Continue Cefepime


Urology consult appreciated


HD on hold


Limit psychotropes


CT brain negative


AM lab: BMP

## 2020-03-20 NOTE — PRG
DATE OF SERVICE:  03/20/2020



SUBJECTIVE:  Patient was seen and examined at bedside and overnight events noted.

Patient denies any shortness of breath or chest pain or palpitation.  No history of

nausea or vomiting or diarrhea or fever or chills or cramps. 



OBJECTIVE:  General:  This is well-built male, in no apparent distress. 

Vital Signs:  Temperature __________.  Heart Rate 95.  Respiratory rate 14.  Blood

pressure 162/82. 

HEENT:  Atraumatic, normocephalic. Oral mucosa is moist. 

Neck:  Supple. 

Cardiovascular:  S1, S2 heard.  Rate and rhythm regular. 

Respiratory:  Clear to auscultation. 

Gastrointestinal:  Abdomen is soft. 

Musculoskeletal:  No tenderness.  No edema. 

Dermatologic:  No skin rash. 

Neurologic:  Alert and awake and oriented x3.  No focal neurologic deficits. Moving

all the extremities. 

Psychiatric:  Mood and affect normal.



LABORATORY DATA:  Potassium 3.1, BUN is 45, creatinine is 3.3.



ASSESSMENT AND PLAN:  

1. Acute kidney injury on chronic kidney disease, stage 4, stable.  No acute

indication for dialysis. 

2. Hypernatremia with hyperchloremia.  Encourage free water.

3. Hypokalemia.

4. Acidosis.

5. Edema.

6. History of hypertension.

7. Obstructive uropathy.

8. Altered mentation, seems to be better. 

His mentation is getting better.  No acute indication for dialysis.  Encourage free

water and replace potassium. 







Job ID:  [id number]

## 2020-03-21 LAB
ANION GAP SERPL CALC-SCNC: 13 MMOL/L (ref 10–20)
BUN SERPL-MCNC: 42 MG/DL (ref 8.4–25.7)
CALCIUM SERPL-MCNC: 8.1 MG/DL (ref 7.8–10.44)
CHLORIDE SERPL-SCNC: 112 MMOL/L (ref 98–107)
CO2 SERPL-SCNC: 22 MMOL/L (ref 23–31)
CREAT CL PREDICTED SERPL C-G-VRATE: 19 ML/MIN (ref 70–130)
GLUCOSE SERPL-MCNC: 102 MG/DL (ref 83–110)
POTASSIUM SERPL-SCNC: 2.6 MMOL/L (ref 3.5–5.1)
SODIUM SERPL-SCNC: 144 MMOL/L (ref 136–145)

## 2020-03-21 RX ADMIN — MOMETASONE FUROATE AND FORMOTEROL FUMARATE DIHYDRATE SCH: 200; 5 AEROSOL RESPIRATORY (INHALATION) at 18:52

## 2020-03-21 RX ADMIN — DOCUSATE SODIUM 50 MG AND SENNOSIDES 8.6 MG SCH TAB: 8.6; 5 TABLET, FILM COATED ORAL at 08:21

## 2020-03-21 RX ADMIN — MOMETASONE FUROATE AND FORMOTEROL FUMARATE DIHYDRATE SCH PUFF: 200; 5 AEROSOL RESPIRATORY (INHALATION) at 07:46

## 2020-03-21 RX ADMIN — Medication SCH ML: at 08:21

## 2020-03-21 RX ADMIN — ASPIRIN SCH MG: 81 TABLET ORAL at 08:21

## 2020-03-21 RX ADMIN — Medication SCH ML: at 20:55

## 2020-03-21 NOTE — PDOC.HOSPP
- Subjective


Encounter Date: 03/21/20


Encounter Time: 11:57


Subjective: 





Mr. Mckeon was seen today in follow-up of acute kidney injury, and obstructive 

uropathy. He is a bit drowsy this afternoon. His wife is at the bedside, she 

assures me that he was much more alert earlier today. He ate breakfast, and 

told her what he wanted for lunch. He was still a little confused, but admits 

he is improved.





- Objective


Vital Signs & Weight: 


 Vital Signs (12 hours)











  Temp Pulse Resp BP BP Pulse Ox


 


 03/21/20 08:36       100


 


 03/21/20 08:20   70   168/75 H  


 


 03/21/20 08:00  98.0 F  71  20   168/75 H  100


 


 03/21/20 03:00  97.9 F  81  20   154/77 H  100








 Weight











Admit Weight                   138 lb 11.2 oz


 


Weight                         139 lb 5.314 oz














I&O: 


 











 03/20/20 03/21/20 03/22/20





 06:59 06:59 06:59


 


Intake Total 850 1800 


 


Output Total 2290 3575 


 


Balance -1440 -7425 











Result Diagrams: 


 03/20/20 06:00





 03/21/20 06:45





Hospitalist ROS





- Medication


Medications: 


Active Medications











Generic Name Dose Route Start Last Admin





  Trade Name Freq  PRN Reason Stop Dose Admin


 


Acetaminophen  1,000 mg  03/14/20 09:45  03/15/20 08:24





  Tylenol  PO   1,000 mg





  Q6H PRN   Administration





  Mild Pain (1-3)   





     





     





     


 


Hydrocodone Bitart/Acetaminophen  1 tab  03/14/20 09:45  03/16/20 15:00





  [place] 5/325  PO   1 tab





  Q6H PRN   Administration





  Pain   





     





     





     


 


Amlodipine Besylate  5 mg  03/15/20 09:00  03/21/20 08:20





  Norvasc  PO   5 mg





  DAILY PREET   Administration





     





     





     





     


 


Aspirin  81 mg  03/15/20 09:00  03/21/20 08:21





  Ecotrin  PO   81 mg





  DAILY PREET   Administration





     





     





     





     


 


Bisacodyl  10 mg  03/14/20 10:07  03/15/20 08:26





  Dulcolax  CT   10 mg





  Q8H PRN   Administration





  Constipation   





     





     





     


 


Famotidine  20 mg  03/18/20 09:00  03/21/20 08:21





  Pepcid  PO   20 mg





  DAILY PREET   Administration





     





     





     





     


 


Gabapentin  100 mg  03/14/20 21:00  03/20/20 22:30





  Neurontin  PO   100 mg





  HS PREET   Administration





     





     





     





     


 


Hydralazine HCl  10 mg  03/14/20 09:45  03/19/20 08:42





  Apresoline  SLOW IVP   10 mg





  Q4H PRN   Administration





  SBP > 180 and HR < 70   





     





     





     


 


Cefepime HCl 1 gm/ Sodium  100 mls @ 200 mls/hr  03/17/20 21:00  03/20/20 22:31





  Chloride  IVPB   100 mls





  2100 PREET   Administration





     





     





     





     


 


Sodium Chloride  1,000 mls @ 75 mls/hr  03/19/20 09:15  03/21/20 04:57





  1/2 Normal Saline  IV   1,000 mls





  .[id number] PREET   Administration





     





     





     





     


 


Mometasone Furoate/Formoterol Fumar  2 puff  03/14/20 18:30  03/21/20 07:46





  Dulera 200 Mcg/5 Mcg Inhaler  INH   2 puff





  BID-RT PREET   Administration





     





     





     





     


 


Senna/Docusate Sodium  2 tab  03/15/20 09:00  03/21/20 08:21





  Senokot S  PO   2 tab





  DAILY PREET   Administration





     





     





     





     


 


Sodium Chloride  10 ml  03/14/20 21:00  03/21/20 08:21





  Flush - Normal Saline  IVF   10 ml





  Q12HR PREET   Administration





     





     





     





     


 


Tamsulosin HCl  0.4 mg  03/14/20 21:00  03/21/20 08:21





  Flomax  PO   0.4 mg





  BID PREET   Administration





     





     





     





     














- Exam


Eye: PERRL


Heart: RRR, no murmur, no gallops, no rubs, normal peripheral pulses


Respiratory: CTAB (with occasional rales at the bases), no wheezes, no ronchi, 

normal chest expansion


Gastrointestinal: soft, non-tender, non-distended, normal bowel sounds, no 

palpable masses, no hepatomegaly, no splenomegaly


Extremities: no cyanosis, 1+ LE edema (trace lower extremity edema)





Hosp A/P


(1) Toxic metabolic encephalopathy


Code(s): G92 - TOXIC ENCEPHALOPATHY   Status: Acute   





(2) Physical deconditioning


Code(s): R53.81 - OTHER MALAISE   Status: Chronic   





(3) Bladder cancer


Status: Acute   





(4) MSSA (methicillin susceptible Staphylococcus aureus) infection


Code(s): A49.01 - METHICILLIN SUSCEP STAPH INFECTION, UNSP SITE   Status: Acute

   





(5) Pyelonephritis


Code(s): N12 - TUBULO-INTERSTITIAL NEPHRITIS, NOT SPCF AS ACUTE OR CHRONIC   

Status: Acute   





(6) Moderate protein-calorie malnutrition


Code(s): E44.0 - MODERATE PROTEIN-CALORIE MALNUTRITION   Status: Chronic   





(7) Sepsis due to Staphylococcus aureus


Code(s): A41.01 - SEPSIS DUE TO METHICILLIN SUSCEPTIBLE STAPHYLOCOCCUS AUREUS   

Status: Acute   





- Plan





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

- he is s/p Bilateral Nephrostomy tube placement


* His renal function is improving


* Pyelonephritis, with sepsis- due to MSSA- continue Cefepime, but may consider 

lowering the spectrum of activity with a different antibiotic- which may be 

less likely to cause encephalopathy


* Metabolic encephalopathy- likely due to sepsis, and uremia- improving

## 2020-03-21 NOTE — PRG
DATE OF SERVICE:  03/21/2020



SUBJECTIVE:  Patient was seen and examined at bedside and overnight events noted. 



Patient denies any shortness of breath or chest pain or palpitation. 



No history of nausea or vomiting or diarrhea or fever or chills or cramps.



OBJECTIVE:  GENERAL:  This is a well-built male, in no apparent distress. 

VITAL SIGNS:  Temperature 98.7.  Heart rate 71.  Respiratory rate 20.  Blood

pressure 160/75. 

HEENT:  Atraumatic, normocephalic.  Oral mucosa is moist 

NECK:  Supple. 

CARDIOVASCULAR:  S1, S2 heard.  Rate and rhythm regular. 

RESPIRATORY:  Clear to auscultation. 

GASTROINTESTINAL:  Abdomen is soft. 

MUSCULOSKELETAL:  No tenderness.  No edema. 

DERMATOLOGIC:  No skin rash. 

NEUROLOGIC:  Alert and awake and oriented X3.  No focal neurologic deficits. Moving

all the extremities. 

PSYCHIATRIC:  Mood and affect normal.



LABORATORY DATA:  Potassium is 3.6, BUN is 42, and creatinine is 2.9.



ASSESSMENT AND PLAN:  

1. Acute kidney injury on chronic kidney disease stage 4, getting better.

Creatinine is better. 

2. Hypokalemia, replaced.

3. Hypernatremia, better.

4. Hyperchloremia, better.  Continue oral hydration.

5. Edema.

6. Obstructive uropathy. 



Labs are better.  Replace potassium.  Monitor magnesium.  We will follow.







Job ID:  [id number]

## 2020-03-21 NOTE — EKG
Test Reason : 

Blood Pressure : ***/*** mmHG

Vent. Rate : 090 BPM     Atrial Rate : 090 BPM

   P-R Int : 304 ms          QRS Dur : 082 ms

    QT Int : 338 ms       P-R-T Axes : 042 -12 222 degrees

   QTc Int : 413 ms

 

Sinus rhythm with 1st degree A-V block

Low voltage QRS

Septal infarct , age undetermined



Abnormal ECG

New T wave inversion V-3-V4

 

Confirmed by MARIVEL POLANCO ([id number]),  DOMENICA HERNANDEZ ([id number]) on 3/21/2020 1:10:27 PM

 

Referred By:             Confirmed By:MARIVEL POLANCO

## 2020-03-22 LAB
ANION GAP SERPL CALC-SCNC: 14 MMOL/L (ref 10–20)
BUN SERPL-MCNC: 39 MG/DL (ref 8.4–25.7)
CALCIUM SERPL-MCNC: 7.9 MG/DL (ref 7.8–10.44)
CHLORIDE SERPL-SCNC: 111 MMOL/L (ref 98–107)
CO2 SERPL-SCNC: 17 MMOL/L (ref 23–31)
CREAT CL PREDICTED SERPL C-G-VRATE: 21 ML/MIN (ref 70–130)
GLUCOSE SERPL-MCNC: 92 MG/DL (ref 83–110)
POTASSIUM SERPL-SCNC: 2.8 MMOL/L (ref 3.5–5.1)
POTASSIUM SERPL-SCNC: 3.8 MMOL/L (ref 3.5–5.1)
SODIUM SERPL-SCNC: 139 MMOL/L (ref 136–145)

## 2020-03-22 RX ADMIN — MOMETASONE FUROATE AND FORMOTEROL FUMARATE DIHYDRATE SCH PUFF: 200; 5 AEROSOL RESPIRATORY (INHALATION) at 07:11

## 2020-03-22 RX ADMIN — HYDROCODONE BITARTRATE AND ACETAMINOPHEN PRN TAB: 5; 325 TABLET ORAL at 18:03

## 2020-03-22 RX ADMIN — DOCUSATE SODIUM 50 MG AND SENNOSIDES 8.6 MG SCH TAB: 8.6; 5 TABLET, FILM COATED ORAL at 08:06

## 2020-03-22 RX ADMIN — ASPIRIN SCH MG: 81 TABLET ORAL at 08:06

## 2020-03-22 RX ADMIN — Medication SCH: at 08:49

## 2020-03-22 RX ADMIN — Medication SCH: at 19:45

## 2020-03-22 RX ADMIN — HYDROCODONE BITARTRATE AND ACETAMINOPHEN PRN TAB: 5; 325 TABLET ORAL at 08:04

## 2020-03-22 RX ADMIN — MOMETASONE FUROATE AND FORMOTEROL FUMARATE DIHYDRATE SCH PUFF: 200; 5 AEROSOL RESPIRATORY (INHALATION) at 18:43

## 2020-03-22 NOTE — PRG
DATE OF SERVICE:  03/22/2020



SUBJECTIVE:  Patient was seen and examined at bedside and overnight events noted.

Patient denies any shortness of breath or chest pain or palpitation.  No history of

nausea or vomiting or diarrhea or fever or chills or cramps. 



OBJECTIVE:  General:  This is a well-built male, in no apparent distress. 

Vital Signs:  Temperature 98.8.  Heart Rate 70.  Respiratory rate 18.  Blood

pressure 165/71. 

HEENT:  Atraumatic, normocephalic. Oral mucosa is moist. 

Neck:  Supple. 

Cardiovascular:  S1, S2 heard.  Rate and rhythm regular. 

Respiratory:  Clear to auscultation. 

Gastrointestinal:  Abdomen is soft. 

Musculoskeletal:  No tenderness.  No edema. 

Dermatologic:  No skin rash. 

Neurologic:  Alert and awake and oriented x3.  No focal neurologic deficits. Moving

all the extremities. 

Psychiatric:  Mood and affect normal.



LABORATORY DATA:  Potassium 2.8, BUN is 39, and creatinine is 2.5.



ASSESSMENT AND PLAN:  

1. Acute kidney injury on chronic kidney disease stage 4, getting better.

2. Hypokalemia, monitor.

3. Hyperchloremia.

4. Acidosis.

5. Edema.

6. Obstructive uropathy, getting better.

7. Follow up with Urology.

8. No more indication for dialysis.  Okay to remove the femoral dialysis catheter.

The bedside nurse notified. 





Job ID:  [id number]

## 2020-03-22 NOTE — PDOC.HOSPP
- Subjective


Encounter Date: 03/22/20


Encounter Time: 11:51


Subjective: 





Mr. Mckeon was seen today in follow-up of altered mental status. He is more clear 

this morning. He was able to tell me about his physical therapy session from 

earlier this morning. He sais it felt good to be up, but he got a bit winded 

and tired after taking a few steps.





- Objective


Vital Signs & Weight: 


 Vital Signs (12 hours)











  Temp Pulse Resp BP Pulse Ox


 


 03/22/20 08:06   70   


 


 03/22/20 08:00      97


 


 03/22/20 07:12  98.8 F  70  18  165/71 H  97








 Weight











Admit Weight                   138 lb 11.2 oz


 


Weight                         139 lb 5.314 oz














I&O: 


 











 03/21/20 03/22/20 03/23/20





 06:59 06:59 06:59


 


Intake Total 1800 1350 


 


Output Total 3575 2310 


 


Balance -3388 -600 











Result Diagrams: 


 03/20/20 06:00





 03/22/20 06:05





Hospitalist ROS





- Medication


Medications: 


Active Medications











Generic Name Dose Route Start Last Admin





  Trade Name Freq  PRN Reason Stop Dose Admin


 


Acetaminophen  1,000 mg  03/14/20 09:45  03/22/20 00:57





  Tylenol  PO   1,000 mg





  Q6H PRN   Administration





  Mild Pain (1-3)   





     





     





     


 


Hydrocodone Bitart/Acetaminophen  1 tab  03/14/20 09:45  03/22/20 08:04





  [place] 5/325  PO   1 tab





  Q6H PRN   Administration





  Pain   





     





     





     


 


Amlodipine Besylate  5 mg  03/15/20 09:00  03/22/20 08:06





  Norvasc  PO   5 mg





  DAILY PREET   Administration





     





     





     





     


 


Aspirin  81 mg  03/15/20 09:00  03/22/20 08:06





  Ecotrin  PO   81 mg





  DAILY PREET   Administration





     





     





     





     


 


Bisacodyl  10 mg  03/14/20 10:07  03/15/20 08:26





  Dulcolax  OR   10 mg





  Q8H PRN   Administration





  Constipation   





     





     





     


 


Famotidine  20 mg  03/18/20 09:00  03/22/20 08:06





  Pepcid  PO   20 mg





  DAILY PREET   Administration





     





     





     





     


 


Gabapentin  100 mg  03/14/20 21:00  03/21/20 20:54





  Neurontin  PO   100 mg





  HS PREET   Administration





     





     





     





     


 


Hydralazine HCl  10 mg  03/14/20 09:45  03/19/20 08:42





  Apresoline  SLOW IVP   10 mg





  Q4H PRN   Administration





  SBP > 180 and HR < 70   





     





     





     


 


Cefepime HCl 1 gm/ Sodium  100 mls @ 200 mls/hr  03/17/20 21:00  03/21/20 20:54





  Chloride  IVPB   100 mls





  2100 PREET   Administration





     





     





     





     


 


Mometasone Furoate/Formoterol Fumar  2 puff  03/14/20 18:30  03/22/20 07:11





  Dulera 200 Mcg/5 Mcg Inhaler  INH   2 puff





  BID-RT PREET   Administration





     





     





     





     


 


Senna/Docusate Sodium  2 tab  03/15/20 09:00  03/22/20 08:06





  Senokot S  PO   2 tab





  DAILY PREET   Administration





     





     





     





     


 


Sodium Chloride  10 ml  03/14/20 21:00  03/22/20 08:49





  Flush - Normal Saline  IVF   Not Given





  Q12HR PREET   





     





     





     





     


 


Tamsulosin HCl  0.4 mg  03/14/20 21:00  03/22/20 08:06





  Flomax  PO   0.4 mg





  BID PREET   Administration





     





     





     





     














- Exam


Eye: PERRL


Heart: RRR, no murmur, no gallops, no rubs, normal peripheral pulses


Respiratory: CTAB, no wheezes, no rales, no ronchi, normal chest expansion


Gastrointestinal: soft, non-tender, non-distended, normal bowel sounds, no 

palpable masses


Extremities: 1+ LE edema





Hosp A/P


(1) Toxic metabolic encephalopathy


Code(s): G92 - TOXIC ENCEPHALOPATHY   Status: Acute   





(2) Physical deconditioning


Code(s): R53.81 - OTHER MALAISE   Status: Chronic   





(3) Bladder cancer


Status: Acute   





(4) MSSA (methicillin susceptible Staphylococcus aureus) infection


Code(s): A49.01 - METHICILLIN SUSCEP STAPH INFECTION, UNSP SITE   Status: Acute

   





(5) Pyelonephritis


Code(s): N12 - TUBULO-INTERSTITIAL NEPHRITIS, NOT SPCF AS ACUTE OR CHRONIC   

Status: Acute   





(6) Moderate protein-calorie malnutrition


Code(s): E44.0 - MODERATE PROTEIN-CALORIE MALNUTRITION   Status: Chronic   





(7) Sepsis due to Staphylococcus aureus


Code(s): A41.01 - SEPSIS DUE TO METHICILLIN SUSCEPTIBLE STAPHYLOCOCCUS AUREUS   

Status: Acute   





- Plan





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

- he is s/p Bilateral Nephrostomy tube placement


* His renal function continues to improve


* Pyelonephritis, with sepsis- due to MSSA- continue Cefepime, but may consider 

lowering the spectrum of activity with a different antibiotic- which may be 

less likely to cause encephalopathy


* Metabolic encephalopathy-improving


* Bladder cancer- stable


* Deconditioning- discussed with the patient's wife, she says she would prefer 

to take the patient home vs. skilled nursing due to the current pandemic. WIll 

discuss with Case- management tomorrow

## 2020-03-23 RX ADMIN — MOMETASONE FUROATE AND FORMOTEROL FUMARATE DIHYDRATE SCH PUFF: 200; 5 AEROSOL RESPIRATORY (INHALATION) at 07:46

## 2020-03-23 RX ADMIN — CEFTRIAXONE SCH MLS: 2 INJECTION, POWDER, FOR SOLUTION INTRAMUSCULAR; INTRAVENOUS at 18:37

## 2020-03-23 RX ADMIN — Medication SCH: at 08:48

## 2020-03-23 RX ADMIN — MOMETASONE FUROATE AND FORMOTEROL FUMARATE DIHYDRATE SCH: 200; 5 AEROSOL RESPIRATORY (INHALATION) at 19:38

## 2020-03-23 RX ADMIN — HYDROCODONE BITARTRATE AND ACETAMINOPHEN PRN TAB: 5; 325 TABLET ORAL at 08:52

## 2020-03-23 RX ADMIN — Medication SCH: at 20:05

## 2020-03-23 RX ADMIN — DOCUSATE SODIUM 50 MG AND SENNOSIDES 8.6 MG SCH TAB: 8.6; 5 TABLET, FILM COATED ORAL at 08:47

## 2020-03-23 RX ADMIN — HYDROCODONE BITARTRATE AND ACETAMINOPHEN PRN TAB: 5; 325 TABLET ORAL at 01:41

## 2020-03-23 RX ADMIN — ASPIRIN SCH MG: 81 TABLET ORAL at 08:47

## 2020-03-23 NOTE — PRG
DATE OF SERVICE:  03/22/2020



SUBJECTIVE:  The patient is awake.  He is able to answer questions appropriately.

He is in no pain.  His appetite is present, although still suppressed. 



OBJECTIVE:  VITAL SIGNS:  Temperature 97.4, pulse 76, O2 saturation 100%, and blood

pressure 128/69. 

ABDOMEN:  Soft and nontender. 

EXTREMITIES:  No edema.



LABORATORY DATA:  Hemoglobin on 03/20/2020 was 8.1, hematocrit 25.4.  Electrolytes,

creatinine 2.55. 



ASSESSMENT:  Metabolic encephalopathy, much improved.  Renal function also improved

with creatinine down to 2.55 after a single episode of hemodialysis.  Both

nephrostomy tubes are draining well. 



PLAN:  Continue rehabilitation.  He is currently too deconditioned to do well with

radical cystectomy at this point.  His performance status will need to improve.  In

that regard, his surgical procedure would be considered emergency and not

necessarily need to be delayed as an elective case once he is physically able to

tolerate the procedure. 



RECOMMENDATIONS:  Physical therapy, increase caloric intake to improve nutritional

status.  No further urologic recommendations at this time. 







Job ID:  [id number]

## 2020-03-23 NOTE — PRG
DATE OF SERVICE:  03/23/2020



SUBJECTIVE:  A 79-year-old male being seen for acute kidney injury.  The patient

denied any nausea, vomiting, or chest pain. 



OBJECTIVE:  GENERAL:  The patient is awake and alert. 

VITAL SIGNS:  Afebrile, pulse 78, breathing at 16, blood pressure 128/70. 

HEENT:  Head normocephalic and atraumatic.  Eyes intact, no ulcers.  Nose intact, no

ulcers.  Ears intact, no ulcers. 

NECK:  Supple.  No JVD. 

CHEST:  Symmetrical and clear. 

CARDIOVASCULAR:  Shows S1 and S2, no rub, no murmur. 

GASTROINTESTINAL:  Abdomen is soft, bowel sounds positive. 

EXTREMITIES:  Show no edema or ulcers. 

SKIN:  Shows no rash or petechiae. 

MUSCULOSKELETAL:  Shows no joint swelling or stiffness. 

GENITOURINARY:  Shows no Del Angel or CVA tenderness. 

NEUROLOGIC:  Motor intact.  Cranial nerves intact.



LABORATORY DATA:  Hemoglobin 8.1.  Potassium 3.8.



ASSESSMENT AND PLAN:  

1. Chronic kidney disease, stage 4, stable.

2. Hypertension, stable.

3. Anemia, stable.

4. Acute kidney injury, improving.  No indication for dialysis.







Job ID:  [id number]

## 2020-03-23 NOTE — PDOC.HOSPP
- Subjective


Encounter Date: 03/23/20


Encounter Time: 15:00


Subjective: 





Mr. Mckeon was seen today in follow-up of UTI with encephalopathy. He is more 

alert, he does not have any complaints. He is still quite weak.





- Objective


Vital Signs & Weight: 


 Vital Signs (12 hours)











  Temp Pulse Resp BP BP Pulse Ox


 


 03/23/20 08:52       97


 


 03/23/20 08:47   98   128/70  


 


 03/23/20 07:48  98.1 F  98  16   128/70  97








 Weight











Admit Weight                   138 lb 11.2 oz


 


Weight                         139 lb 5.314 oz














I&O: 


 











 03/22/20 03/23/20 03/24/20





 06:59 06:59 06:59


 


Intake Total 1350  


 


Output Total 2310 2350 


 


Balance -960 -2350 











Result Diagrams: 


 03/20/20 06:00





 03/22/20 14:47





Hospitalist ROS





- Medication


Medications: 


Active Medications











Generic Name Dose Route Start Last Admin





  Trade Name Freq  PRN Reason Stop Dose Admin


 


Acetaminophen  1,000 mg  03/14/20 09:45  03/22/20 00:57





  Tylenol  PO   1,000 mg





  Q6H PRN   Administration





  Mild Pain (1-3)   





     





     





     


 


Hydrocodone Bitart/Acetaminophen  1 tab  03/14/20 09:45  03/23/20 08:52





  [place] 5/325  PO   1 tab





  Q6H PRN   Administration





  Pain   





     





     





     


 


Amlodipine Besylate  5 mg  03/15/20 09:00  03/23/20 08:47





  Norvasc  PO   5 mg





  DAILY PREET   Administration





     





     





     





     


 


Aspirin  81 mg  03/15/20 09:00  03/23/20 08:47





  Ecotrin  PO   81 mg





  DAILY PREET   Administration





     





     





     





     


 


Bisacodyl  10 mg  03/14/20 10:07  03/15/20 08:26





  Dulcolax  TX   10 mg





  Q8H PRN   Administration





  Constipation   





     





     





     


 


Famotidine  20 mg  03/18/20 09:00  03/23/20 08:48





  Pepcid  PO   20 mg





  DAILY PREET   Administration





     





     





     





     


 


Gabapentin  100 mg  03/14/20 21:00  03/22/20 20:47





  Neurontin  PO   100 mg





  HS PREET   Administration





     





     





     





     


 


Hydralazine HCl  10 mg  03/14/20 09:45  03/19/20 08:42





  Apresoline  SLOW IVP   10 mg





  Q4H PRN   Administration





  SBP > 180 and HR < 70   





     





     





     


 


Cefepime HCl 1 gm/ Sodium  100 mls @ 200 mls/hr  03/17/20 21:00  03/22/20 20:47





  Chloride  IVPB   100 mls





  2100 PREET   Administration





     





     





     





     


 


Sodium Chloride  1,000 mls @ 50 mls/hr  03/22/20 10:57  03/23/20 06:09





  1/2 Normal Saline  IV   Not Given





  .Q20H PREET   





     





     





     





     


 


Mometasone Furoate/Formoterol Fumar  2 puff  03/14/20 18:30  03/23/20 07:46





  Dulera 200 Mcg/5 Mcg Inhaler  INH   2 puff





  BID-RT PREET   Administration





     





     





     





     


 


Senna/Docusate Sodium  2 tab  03/15/20 09:00  03/23/20 08:47





  Senokot S  PO   2 tab





  DAILY PREET   Administration





     





     





     





     


 


Sodium Chloride  10 ml  03/14/20 21:00  03/23/20 08:48





  Flush - Normal Saline  IVF   Not Given





  Q12HR PREET   





     





     





     





     


 


Tamsulosin HCl  0.4 mg  03/14/20 21:00  03/23/20 08:47





  Flomax  PO   0.4 mg





  BID PREET   Administration





     





     





     





     














- Exam


Eye: PERRL


Heart: RRR, no murmur, no gallops, no rubs, normal peripheral pulses


Respiratory: CTAB (with the exception of course breath sounds)


Gastrointestinal: soft, non-tender, non-distended, normal bowel sounds, no 

palpable masses, no hepatomegaly


Extremities: no cyanosis





Hosp A/P


(1) Toxic metabolic encephalopathy


Code(s): G92 - TOXIC ENCEPHALOPATHY   Status: Acute   





(2) Physical deconditioning


Code(s): R53.81 - OTHER MALAISE   Status: Chronic   





(3) Bladder cancer


Status: Acute   





(4) MSSA (methicillin susceptible Staphylococcus aureus) infection


Code(s): A49.01 - METHICILLIN SUSCEP STAPH INFECTION, UNSP SITE   Status: Acute

   





(5) Pyelonephritis


Code(s): N12 - TUBULO-INTERSTITIAL NEPHRITIS, NOT SPCF AS ACUTE OR CHRONIC   

Status: Acute   





(6) Moderate protein-calorie malnutrition


Code(s): E44.0 - MODERATE PROTEIN-CALORIE MALNUTRITION   Status: Chronic   





(7) Sepsis due to Staphylococcus aureus


Code(s): A41.01 - SEPSIS DUE TO METHICILLIN SUSCEPTIBLE STAPHYLOCOCCUS AUREUS   

Status: Acute   





- Plan





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

- he is s/p Bilateral Nephrostomy tube placement


* His renal function continues to improve- I spoke with Dr. Vu and he does 

not believe he will require any further dialysis


* Pyelonephritis, with sepsis- due to MSSA- continue Cefepime- will consult ID 

for recommendations on antibiotic choice going forward


* Metabolic encephalopathy- much improved


* Bladder cancer- stable


* Deconditioning- continue PT/OT


* Plan is for discharge home with Home health- possibly tomorrow- depending on 

ID recommendations

## 2020-03-23 NOTE — CON
DATE OF CONSULTATION:  03/23/2020



REASON FOR CONSULTATION:  Bacteremia.



HISTORY OF PRESENT ILLNESS:  A 79-year-old patient, history of prostate and 
bladder

cancer.  The prostate cancer is in remission.  The bladder cancer had 
invasiveness

towards the muscle.  He was treated with gemcitabine and cisplatin neoadjuvant

chemotherapy.  He has had obstructive uropathy and has required percutaneous

nephrostomy, initially on the left side, also with a stent and then 
subsequently on

the right side.  In November, he presented with fever and neutropenia.

Staphylococcus aureus was retrieved from a sample of urine, but blood cultures 
were

negative at 5 days.  He was given levofloxacin every other day for five doses 
after

discharge.  On December 23, he was readmitted with hypotension and lactic 
acidosis.

Creatinine was 3.98.  White cell count was 3.5 and then 4.6.  Repeat urine 
culture

from the nephrostomy tube was negative at 48 hours, and patient at this time

presented to the emergency room on March 14 with fever, maximum temperature 101 
to

101.9.  He had some cough but no dyspnea.  Initial findings included a 
temperature

of 101.7, respirations 19, pulse 102, and blood pressure 158/76.  The exam 
shows the

left nephrostomy tube.  He is awake and alert, a little bit drowsy.  Other 
findings

included white cell count 12.3 and then 9.5, hemoglobin was 7.3 and then 8.1,

platelets 245, and 82% neutrophils on admission.  The creatinine was 5.78 with a

bilirubin 0.4, albumin 2.9.  Urinalysis with greater than 50 wbc's.  
Microbiology

has shown Staph aureus, the same organism that had been isolated in November and

this time also and 2 sets of blood cultures and one sample from the left-sided

nephrostomy tube.  Patient has been given cefepime and had hemodialysis catheter

placed.  For the first few days, he was dialyzed once, but there has been

improvement in his renal function and no more dialysis has been required.  On 
March 16th, patient underwent fluoroscopic-guided left percutaneous nephrostomy 
placement.

 Currently, Mr. Mckeon is drowsy.  He is arousable, but appears chronically ill, 
but

in no acute distress.  Denies any headaches.  No shortness of breath, cough, or

sputum production.  Mild abdominal tenderness, mostly in the suprapubic area.  
He

does not have much urine output through the penis, mostly through the 
nephrostomy

tubes due to the obstructive uropathy.  He is diffusely weak with atrophy of

musculature in the lower extremities and does not ambulate. 



PAST MEDICAL HISTORY:  Includes prostate cancer, in remission; bladder cancer,

locally invasive, status post chemotherapy with new adjuvant intent for 
subsequent

surgical resection and ileal conduit formation; CKD, stage 3 to 4; and 
hypertension. 



SURGICAL HISTORY:  Hernia repair; left ureteral stent; left and right 
percutaneous

nephrostomies; and MediPort placement on the right side, which has not been used

lately. 



ALLERGIES:  NONE.



FAMILY HISTORY:  Noncontributory.



SOCIAL HISTORY:  Former smoker.  Lives in [place] with wife.



CURRENT MEDICATIONS:  In addition to the cefepime, he is on p.r.n. medications, 
IV

fluids, and tamsulosin. 



PHYSICAL EXAMINATION:

VITAL SIGNS:  Temperature has been normal, blood pressure 120/70, pulse is 80,

respirations 16, and O2 saturation 97. 

SKIN EXAM:  Shows the right-sided groin Trialysis catheter.  The port in the 
right

subclavian location with no inflammatory changes, and the bilateral nephrostomy

tubes.  No lymphadenopathy. 

HEENT:  Ocular movements conjugate.  Sclerae white.  Pupils are equal.  Oral 
cavity

with no native teeth.  Dry oral mucosa. 

NECK:  Supple without jugular vein distention.  No carotid bruits. 

LUNGS:  Symmetric air entry. 

HEART:  S1, S2.  Regular rate.  No S3 or S4. 

ABDOMEN:  Soft. Mild tenderness in suprapubic area.  No ascites.  No 
distention.  No

organomegaly noted. 

EXTREMITIES:  There is some pain on mobilization of the left hip, mostly on 
external

rotation, but not internal rotation.  Flexion and extension do not elicit pain.
  No

other joint inflammatory process is noted.  He has chronic low back pain, which 
is

unchanged from prior symptoms, review with the wife.  He is able to move feet, 
but

is diffusely weak.  Pulses are 1+ in dorsalis pedis.  Plantar responses are 
flexor. 

NEUROLOGIC:  He is awake, knows his name and recognizes wife.  He is speech is

monosyllabic for the most part.  He cannot answer with full sentences and tends 
to

fall asleep very frequently. 



LABORATORY DATA:  The latest labs with white cell count 9.5, hemoglobin 8.1,

platelets 233 with normal differential.  Sodium 142, creatinine 3.27 and now is 
down

to 2.55.  The lowest creatinine was 2.18 in November 2019.  Urinalysis has been

discussed and the microbiology with the findings discussed above as well.  The

patient had a brain CT on admission, which showed no mass or bleed, partial

opacification of left mastoid.  He had an abdomen and pelvis CT on admission, 
which

demonstrated bilateral moderate hydronephrosis on the right side; stranding

retroperitoneal fat, left greater than right.  No obstruction of the bowel. 



ASSESSMENT:  

1. Prostate cancer in remission.  Bladder cancer, locally invasive, status post

neoadjuvant chemotherapy. 

2. Persistent Staphylococcus aureus, methicillin sensitive and urinary tract now

with bacteremia and sepsis. 

3. Acute renal failure superimposed on chronic with improvement since admission.



DISCUSSION:  Patient likely will have his Trialysis catheter removed, which is a

good thing in view of the bacteremia.  The differential diagnosis for the 
bacteremia

includes a pure urinary tract origin versus distant site of dissemination.  For

example, the port which would be the main concern here.  At this moment, I would

advise sampling of blood from the port.  If that is negative, then would advise

continuation of IV antimicrobial therapy through the port.  Oral antimicrobial

therapy with quinolones or beta-lactams would likely result in failure of 
treatment

and relapse.  I would treat this gentleman at least for another 4 weeks and 
probably

with Rocephin which would not require adjustment of drug dosing and would allow

for once daily administration.  If the cultures from the port demonstrated to be

positive, then the port would have to be removed.  Even after completion of this

therapy, he may be at risk for port colonization and recurrence of bacteremia.  
He

also will be at risk for seeding of the left hip as well as the lower back which

have underlying severe osteoarthritis. 





Job ID:  [id number]



MTDD

## 2020-03-24 LAB
ANION GAP SERPL CALC-SCNC: 11 MMOL/L (ref 10–20)
BUN SERPL-MCNC: 32 MG/DL (ref 8.4–25.7)
CALCIUM SERPL-MCNC: 8.1 MG/DL (ref 7.8–10.44)
CHLORIDE SERPL-SCNC: 110 MMOL/L (ref 98–107)
CO2 SERPL-SCNC: 20 MMOL/L (ref 23–31)
CREAT CL PREDICTED SERPL C-G-VRATE: 24 ML/MIN (ref 70–130)
GLUCOSE SERPL-MCNC: 98 MG/DL (ref 83–110)
HGB BLD-MCNC: 7 G/DL (ref 14–18)
POTASSIUM SERPL-SCNC: 3.4 MMOL/L (ref 3.5–5.1)
SODIUM SERPL-SCNC: 138 MMOL/L (ref 136–145)

## 2020-03-24 RX ADMIN — ASPIRIN SCH MG: 81 TABLET ORAL at 08:00

## 2020-03-24 RX ADMIN — HYDROCODONE BITARTRATE AND ACETAMINOPHEN PRN TAB: 5; 325 TABLET ORAL at 02:29

## 2020-03-24 RX ADMIN — DOCUSATE SODIUM 50 MG AND SENNOSIDES 8.6 MG SCH TAB: 8.6; 5 TABLET, FILM COATED ORAL at 08:00

## 2020-03-24 RX ADMIN — Medication SCH: at 08:00

## 2020-03-24 RX ADMIN — HYDROCODONE BITARTRATE AND ACETAMINOPHEN PRN TAB: 5; 325 TABLET ORAL at 20:16

## 2020-03-24 RX ADMIN — Medication SCH: at 20:08

## 2020-03-24 RX ADMIN — MOMETASONE FUROATE AND FORMOTEROL FUMARATE DIHYDRATE SCH PUFF: 200; 5 AEROSOL RESPIRATORY (INHALATION) at 06:54

## 2020-03-24 RX ADMIN — MOMETASONE FUROATE AND FORMOTEROL FUMARATE DIHYDRATE SCH PUFF: 200; 5 AEROSOL RESPIRATORY (INHALATION) at 18:57

## 2020-03-24 RX ADMIN — CEFTRIAXONE SCH MLS: 2 INJECTION, POWDER, FOR SOLUTION INTRAMUSCULAR; INTRAVENOUS at 17:28

## 2020-03-24 NOTE — PRG
DATE OF SERVICE:  03/24/2020



SUBJECTIVE:  A 79-year-old gentleman being seen for acute kidney injury.  The

patient denied any nausea, vomiting, or chest pain. 



OBJECTIVE:  GENERAL:  The patient is awake and alert. 

VITAL SIGNS:  Afebrile, pulse 75, breathing at 16, and blood pressure 157/70. 

HEENT:  Head normocephalic and atraumatic.  Eyes intact, no ulcers.  Nose intact, no

ulcers.  Ears intact, no ulcers. 

NECK:  Supple.  No JVD. 

CHEST:  Symmetrical and clear. 

CARDIOVASCULAR:  Shows S1 and S2, no rub, no murmur. 

GASTROINTESTINAL:  Abdomen is soft, bowel sounds positive. 

EXTREMITIES:  Show no edema or ulcers. 

SKIN:  Shows no rash or petechiae. 

MUSCULOSKELETAL:  Shows no joint swelling or stiffness. 

GENITOURINARY:  Shows no Del Angel or CVA tenderness. 

NEUROLOGIC:  Motor intact.  Cranial nerves intact.



LABORATORY DATA:  Labs show hemoglobin 8.1.



ASSESSMENT AND PLAN:  

1. Chronic kidney disease, stage 4.  Recheck labs.

2. Hypertension, stable.

3. Hypokalemia.  Recheck labs today.

4. Anemia.  We will check labs.







Job ID:  [id number]

## 2020-03-24 NOTE — PDOC.HOSPP
- Subjective


Encounter Date: 03/24/20


Encounter Time: 16:04


Subjective: 





Mr. Mckeon was seen today in follow-up of UTI with sepsis. He is much improved. 

He does not have any complaints.





- Objective


Vital Signs & Weight: 


 Vital Signs (12 hours)











  Temp Pulse Resp BP Pulse Ox


 


 03/24/20 09:40  98.9 F    


 


 03/24/20 08:00   77   


 


 03/24/20 07:54  99.0 F  77  16  157/70 H  98


 


 03/24/20 06:54   72  16   98








 Weight











Admit Weight                   138 lb 11.2 oz


 


Weight                         139 lb 5.314 oz














I&O: 


 











 03/23/20 03/24/20 03/25/20





 06:59 06:59 06:59


 


Intake Total  1850 


 


Output Total 2555 2218 3463


 


Balance -7910 -725 -1100











Result Diagrams: 


 03/24/20 10:15





 03/24/20 10:15





Hospitalist ROS





- Medication


Medications: 


Active Medications











Generic Name Dose Route Start Last Admin





  Trade Name Freq  PRN Reason Stop Dose Admin


 


Acetaminophen  1,000 mg  03/14/20 09:45  03/24/20 15:29





  Tylenol  PO   1,000 mg





  Q6H PRN   Administration





  Mild Pain (1-3)   





     





     





     


 


Amlodipine Besylate  5 mg  03/15/20 09:00  03/24/20 08:00





  Norvasc  PO   5 mg





  DAILY PREET   Administration





     





     





     





     


 


Aspirin  81 mg  03/15/20 09:00  03/24/20 08:00





  Ecotrin  PO   81 mg





  DAILY PREET   Administration





     





     





     





     


 


Bisacodyl  10 mg  03/14/20 10:07  03/15/20 08:26





  Dulcolax  IA   10 mg





  Q8H PRN   Administration





  Constipation   





     





     





     


 


Famotidine  20 mg  03/18/20 09:00  03/24/20 08:00





  Pepcid  PO   20 mg





  DAILY PREET   Administration





     





     





     





     


 


Gabapentin  100 mg  03/14/20 21:00  03/23/20 20:04





  Neurontin  PO   100 mg





  HS PREET   Administration





     





     





     





     


 


Hydralazine HCl  10 mg  03/14/20 09:45  03/19/20 08:42





  Apresoline  SLOW IVP   10 mg





  Q4H PRN   Administration





  SBP > 180 and HR < 70   





     





     





     


 


Sodium Chloride  1,000 mls @ 50 mls/hr  03/22/20 10:57  03/24/20 02:10





  1/2 Normal Saline  IV   1,000 mls





  .Q20H PREET   Administration





     





     





     





     


 


Ceftriaxone Sodium 2 gm/  100 mls @ 200 mls/hr  03/23/20 18:00  03/23/20 18:37





  Sodium Chloride  IVPB   100 mls





  Q24HR PREET   Administration





     





     





     





     


 


Mometasone Furoate/Formoterol Fumar  2 puff  03/14/20 18:30  03/24/20 06:54





  Dulera 200 Mcg/5 Mcg Inhaler  INH   2 puff





  BID-RT PREET   Administration





     





     





     





     


 


Senna/Docusate Sodium  2 tab  03/15/20 09:00  03/24/20 08:00





  Senokot S  PO   2 tab





  DAILY PREET   Administration





     





     





     





     


 


Sodium Chloride  10 ml  03/14/20 21:00  03/24/20 08:00





  Flush - Normal Saline  IVF   Not Given





  Q12HR PREET   





     





     





     





     


 


Tamsulosin HCl  0.4 mg  03/14/20 21:00  03/24/20 08:00





  Flomax  PO   0.4 mg





  BID PREET   Administration





     





     





     





     














- Exam


Eye: PERRL


Respiratory: CTAB, no wheezes, no rales, no ronchi, normal chest expansion, no 

tachypnea


Gastrointestinal: soft, non-tender, non-distended, normal bowel sounds, no 

palpable masses, no hepatomegaly


Extremities: no cyanosis, no edema





Hosp A/P


(1) Toxic metabolic encephalopathy


Code(s): G92 - TOXIC ENCEPHALOPATHY   Status: Acute   





(2) Physical deconditioning


Code(s): R53.81 - OTHER MALAISE   Status: Chronic   





(3) Bladder cancer


Status: Acute   





(4) MSSA (methicillin susceptible Staphylococcus aureus) infection


Code(s): A49.01 - METHICILLIN SUSCEP STAPH INFECTION, UNSP SITE   Status: Acute

   





(5) Pyelonephritis


Code(s): N12 - TUBULO-INTERSTITIAL NEPHRITIS, NOT SPCF AS ACUTE OR CHRONIC   

Status: Acute   





(6) Moderate protein-calorie malnutrition


Code(s): E44.0 - MODERATE PROTEIN-CALORIE MALNUTRITION   Status: Chronic   





(7) Sepsis due to Staphylococcus aureus


Code(s): A41.01 - SEPSIS DUE TO METHICILLIN SUSCEPTIBLE STAPHYLOCOCCUS AUREUS   

Status: Acute   





- Plan





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

- he is s/p Bilateral Nephrostomy tube placement


* His renal function is stable


* Pyelonephritis, with sepsis- due to MSSA- ID recommendation noted- 

antibiotics have been changed to Rocephin- and he will need at least 4 weeks of 

therapy


* Await culture results from the Port


* Metabolic encephalopathy- resolved


* Bladder cancer- stable


* Deconditioning- continue PT/OT

## 2020-03-24 NOTE — PRG
DATE OF SERVICE:  03/24/2020



SUBJECTIVE:  The patient awake and more alert today.



OBJECTIVE:  VITAL SIGNS:  Temperature 99, blood pressure 157/70, pulse 77,

respiratory rate 16, O2 saturation 98%. 



LABORATORY DATA:  Creatinine is down to 2.26.



IMPRESSION:  I discussed stent removal with Dr. Gr.  I also discussed this with

the patient and his wife last night.  Everybody is in agreement.  This is in his

best interest.  I discussed the procedure, potential limitations, alternatives, and

complication with him. 



PLAN:  Cystoscopy, left ureteral stent removal.







Job ID:  [id number]

## 2020-03-24 NOTE — PRG
DATE OF SERVICE:  03/24/2020



SUBJECTIVE:  Mr. Mckeon is about the same as yesterday, bit more alert.  He ate

breakfast, but did not have an appetite for lunch.  No headaches.  No visual

symptoms.  No abdominal pain.  Dr. Matute was considering to remove the stent that

the patient has. 



LABORATORY DATA:  White cell count is 9.5, hemoglobin 8.1, platelets 245.  Sodium

138, creatinine 2.26.  Repeat two sets of blood cultures from yesterday are still

pending. 



ASSESSMENT AND DISCUSSION:  Prostate cancer in remission, bladder cancer, locally

invasive, status post neoadjuvant chemotherapy, persistent Staphylococcus aureus

infection, methicillin sensitive with likely colonization of the stent and/or the

nephrostomy tubes.  Nephrostomy tubes are new so those are less likely to be the

problem here and the stent is a more likely scenario.  In addition to that he may

have had an extension to alternate sites such as the port and looks like the port is

not going to be used anymore and I would advise removal of the port since it is not

going to be needed for any more chemotherapy.  Alternately, could keep it in place

for administration of his Rocephin that probably would be the wisest approach unless

the repeat blood cultures are positive.  In that case, I would certainly think that

the removal of the port would be necessary and also the Trialysis catheter, I think

it is going to be removed soon. 







Job ID:  [id number]

## 2020-03-25 LAB
ANION GAP SERPL CALC-SCNC: 13 MMOL/L (ref 10–20)
BUN SERPL-MCNC: 28 MG/DL (ref 8.4–25.7)
CALCIUM SERPL-MCNC: 8.5 MG/DL (ref 7.8–10.44)
CHLORIDE SERPL-SCNC: 109 MMOL/L (ref 98–107)
CO2 SERPL-SCNC: 18 MMOL/L (ref 23–31)
CREAT CL PREDICTED SERPL C-G-VRATE: 25 ML/MIN (ref 70–130)
GLUCOSE SERPL-MCNC: 96 MG/DL (ref 83–110)
HGB BLD-MCNC: 7.9 G/DL (ref 14–18)
POTASSIUM SERPL-SCNC: 3.4 MMOL/L (ref 3.5–5.1)
SODIUM SERPL-SCNC: 137 MMOL/L (ref 136–145)

## 2020-03-25 RX ADMIN — Medication SCH ML: at 20:50

## 2020-03-25 RX ADMIN — Medication SCH ML: at 11:05

## 2020-03-25 RX ADMIN — MOMETASONE FUROATE AND FORMOTEROL FUMARATE DIHYDRATE SCH PUFF: 200; 5 AEROSOL RESPIRATORY (INHALATION) at 07:54

## 2020-03-25 RX ADMIN — ASPIRIN SCH MG: 81 TABLET ORAL at 11:05

## 2020-03-25 RX ADMIN — CEFTRIAXONE SCH MLS: 2 INJECTION, POWDER, FOR SOLUTION INTRAMUSCULAR; INTRAVENOUS at 17:35

## 2020-03-25 RX ADMIN — HYDROCODONE BITARTRATE AND ACETAMINOPHEN PRN TAB: 5; 325 TABLET ORAL at 03:37

## 2020-03-25 RX ADMIN — DOCUSATE SODIUM 50 MG AND SENNOSIDES 8.6 MG SCH TAB: 8.6; 5 TABLET, FILM COATED ORAL at 11:04

## 2020-03-25 RX ADMIN — MOMETASONE FUROATE AND FORMOTEROL FUMARATE DIHYDRATE SCH PUFF: 200; 5 AEROSOL RESPIRATORY (INHALATION) at 18:52

## 2020-03-25 NOTE — PRG
DATE OF SERVICE:  03/25/2020



SUBJECTIVE:  The patient appears much better today.  He is very alert with more

responsiveness while awake.  Denies any headaches.  No chest pain.  No abdominal

pain.  Still has a catheter in the right groin.  The port has not been accessed. 



OBJECTIVE:  VITAL SIGNS:  He is afebrile.  Blood pressure 130/70, pulse 85. 

GENERAL:  Awake, alert, oriented. 

LUNGS:  Clear. 

HEART:  S1 and S2, regular rate. 

ABDOMEN:  Soft, not distended or tender.  Right Trialysis catheter.



LABORATORY DATA:  White cell count 9.5, hemoglobin 8.1, platelets 245.  Chemistry

with creatinine 2.18 and sodium 137.  Dr. Matute has removed the left ureteral

stent.  The blood culture obtained on March 23, one of them the one that was drawn

looks like it is not clear if this is port sample, does not appear there is 1/2

positive for Enterococcus faecalis, which is somewhat of surprise, all the other

three are Staphylococcus aureus. 



ASSESSMENT AND DISCUSSION:  Prostate cancer in remission, bladder cancer, locally

invasive, status post neoadjuvant chemotherapy, persistent Staph aureus infection,

methicillin sensitive with likely colonization of stent which has been removed.

Nephrostomy tubes are new and those should be all right for now.  The next concern

is Trialysis catheter that could be colonized with Enterococcus faecalis or

Staphylococcus aureus or both.  The port may be colonized as well.  At this time, we

will try again that the samples from the port.  I will ask the nurse to access the

port and see if we can remove the Trialysis catheter that the nephrologist agreed to

that measure.  Dr. Linda's note did not make any mention of that possibility. 







Job ID:  [id number]

## 2020-03-25 NOTE — PRG
DATE OF SERVICE:  03/23/2020



SUBJECTIVE:  Awake, alert, in no distress.



OBJECTIVE:  VITAL SIGNS:  Temperature 98.7, pulse 85, respiratory rate 16, O2

saturation 95%, and blood pressure 123/66. 

ABDOMEN: Soft and nontender.  No palpable masses.



LABORATORY DATA:  Chemistry; potassium up to 3.8 on last chemistry panel done

03/22/2020, creatinine was 2.55 on 03/22/2020. 



IMPRESSION:  Prostate cancer, muscle invasive bladder cancer, and bilateral ureteral

obstruction.  He is managed with nephrostomy tubes.  He also has an indwelling left

ureteral stent.  The stent does not allow for flow of urine down into his bladder.

All urine is draining from his right and left nephrostomy tubes.  Both the right and

left nephrostomy tubes were placed since hospital admission.  The ureteral stent has

not been removed or replaced since admission. 



PLAN:  I will discuss with Dr. Gr the need for cystoscopy and left stent removal

or replacement.  I see no reason to replace it as it is not draining and all urine

is draining from his left kidney.  I have discussed this possibility with the

patient and his wife.  They understand the reasons and the expected outcomes.  Plan

cystoscopy, left stent removal and replacement while still an inpatient. 







Job ID:  [id number]

## 2020-03-25 NOTE — OP
DATE OF PROCEDURE:  03/25/2020



PREOPERATIVE DIAGNOSES:  Bilateral ureteral obstruction, prostate cancer, bladder

cancer, Staph aureus bacteremia. 



POSTOPERATIVE DIAGNOSES:  Bilateral ureteral obstruction, prostate cancer, bladder

cancer, Staph aureus bacteremia. 



PROCEDURES PERFORMED:  Cystoscopy, left ureteral stent removal.



INDICATIONS:  Mr. Mckeon has bilateral ureteral obstruction and presented to the

hospital with symptoms of infection.  He has been diagnosed with Staph aureus

bacteremia.  After antibiotics were initiated, his left nephrostomy tube was removed

and a right nephrostomy tube is in place.  He is being brought to the operating room

at this time to remove the left ureteral stent as it is likely seeded with bacteria.

 It will not be replaced as it was not draining urine at all.  Urine output has been

coming through the nephrostomy tubes. 



ANESTHESIA:  General.



DESCRIPTION OF PROCEDURE:  The patient was given general anesthesia.  He was

sterilely prepped and draped in a lithotomy position.  A flexible cystoscope was

passed into the bladder and the bladder was examined in its entirety.  He has small

bladder capacity.  There was some viable tumor seen particularly on the left bladder

wall anteriorly near the bladder neck.  The left ureteral stent was grasped and

removed.  The patient tolerated procedure well, was awakened in the operating room,

transported to the PACU in stable condition. 



SPECIMENS:  None.



COMPLICATIONS:  None.



ESTIMATED BLOOD LOSS:  Less than 5 mL.





Job ID:  [id number]

## 2020-03-25 NOTE — PRG
DATE OF SERVICE:  03/25/2020



SUBJECTIVE:  A 79-year-old gentleman, being seen for acute kidney injury.  The

patient denies any nausea, vomiting, or chest pain. 



PHYSICAL EXAMINATION:

GENERAL:  The patient is awake and alert. 

VITAL SIGNS:  Afebrile, pulse 75, breathing at 16, blood pressure 127/66. 

HEENT:  Head normocephalic and atraumatic.  Eyes intact, no ulcers.  Nose intact, no

ulcers.  Ears intact, no ulcers. 

NECK:  Supple.  No JVD. 

CHEST:  Symmetrical and clear. 

CARDIOVASCULAR:  Shows S1 and S2, no rub, no murmur. 

GASTROINTESTINAL:  Abdomen is soft, bowel sounds positive. 

EXTREMITIES:  Show no edema or ulcers. 

SKIN:  Shows no rash or petechiae. 

MUSCULOSKELETAL:  Shows no joint swelling or stiffness. 

GENITOURINARY:  Shows no Del Angel or CVA tenderness. 

NEUROLOGIC:  Motor intact.  Cranial nerves intact.



LABORATORY DATA:  Reviewed.



ASSESSMENT AND PLAN:  

1. Chronic kidney disease stage 4, stable.  Recheck labs.

2. Hypokalemia.  Recheck labs.

3. Anemia.  We would recommend transfusion.

4. Medication based on GFR appropriate.







Job ID:  [id number]

## 2020-03-25 NOTE — PDOC.HOSPP
- Subjective


Encounter Date: 03/25/20


Encounter Time: 12:41


Subjective: 





Mr. Mckeon was seen today in follow-up of Pyelonephritis and sepsis. He is doing 

much better. He has returned from having the ureteral STENT  removed. She is 

sitting up in bed eating lunch. He does not have any complaints.





- Objective


Vital Signs & Weight: 


 Vital Signs (12 hours)











  Temp Pulse Resp BP BP Pulse Ox


 


 03/25/20 11:42  98.3 F  85  18   135/71  94 L


 


 03/25/20 11:04   82    


 


 03/25/20 10:55  98.6 F  82  20  174/74 H   100


 


 03/25/20 07:54   82  16    99


 


 03/25/20 07:24  98.7 F  84  16   127/66  98


 


 03/25/20 05:01  97.9 F  84  20   129/62  98








 Weight











Admit Weight                   138 lb 11.2 oz


 


Weight                         139 lb 5.314 oz














I&O: 


 











 03/24/20 03/25/20 03/26/20





 06:59 06:59 06:59


 


Intake Total 1850  


 


Output Total 8275 1673 


 


Balance -585 -7971 











Result Diagrams: 


 03/25/20 11:55





 03/25/20 11:54





Hospitalist ROS





- Medication


Medications: 


Active Medications











Generic Name Dose Route Start Last Admin





  Trade Name Freq  PRN Reason Stop Dose Admin


 


Acetaminophen  1,000 mg  03/14/20 09:45  03/24/20 15:29





  Tylenol  PO   1,000 mg





  Q6H PRN   Administration





  Mild Pain (1-3)   





     





     





     


 


Hydrocodone Bitart/Acetaminophen  1 tab  03/24/20 17:57  03/25/20 03:37





  [place] 5/325  PO  03/26/20 17:58  1 tab





  Q6H PRN   Administration





  Pain   





     





     





     


 


Amlodipine Besylate  5 mg  03/15/20 09:00  03/25/20 11:04





  Norvasc  PO   5 mg





  DAILY PREET   Administration





     





     





     





     


 


Aspirin  81 mg  03/15/20 09:00  03/25/20 11:05





  Ecotrin  PO   81 mg





  DAILY PREET   Administration





     





     





     





     


 


Bisacodyl  10 mg  03/14/20 10:07  03/15/20 08:26





  Dulcolax  IA   10 mg





  Q8H PRN   Administration





  Constipation   





     





     





     


 


Famotidine  20 mg  03/18/20 09:00  03/25/20 11:05





  Pepcid  PO   20 mg





  DAILY PREET   Administration





     





     





     





     


 


Gabapentin  100 mg  03/14/20 21:00  03/24/20 20:08





  Neurontin  PO   100 mg





  HS PREET   Administration





     





     





     





     


 


Hydralazine HCl  10 mg  03/14/20 09:45  03/19/20 08:42





  Apresoline  SLOW IVP   10 mg





  Q4H PRN   Administration





  SBP > 180 and HR < 70   





     





     





     


 


Sodium Chloride  1,000 mls @ 50 mls/hr  03/22/20 10:57  03/25/20 11:05





  1/2 Normal Saline  IV   1,000 mls





  .Q20H PREET   Administration





     





     





     





     


 


Ceftriaxone Sodium 2 gm/  100 mls @ 200 mls/hr  03/23/20 18:00  03/24/20 17:28





  Sodium Chloride  IVPB   100 mls





  Q24HR PREET   Administration





     





     





     





     


 


Mometasone Furoate/Formoterol Fumar  2 puff  03/14/20 18:30  03/25/20 07:54





  Dulera 200 Mcg/5 Mcg Inhaler  INH   2 puff





  BID-RT PREET   Administration





     





     





     





     


 


Senna/Docusate Sodium  2 tab  03/15/20 09:00  03/25/20 11:04





  Senokot S  PO   2 tab





  DAILY PREET   Administration





     





     





     





     


 


Sodium Chloride  10 ml  03/14/20 21:00  03/25/20 11:05





  Flush - Normal Saline  IVF   10 ml





  Q12HR PREET   Administration





     





     





     





     


 


Tamsulosin HCl  0.4 mg  03/14/20 21:00  03/25/20 11:04





  Flomax  PO   0.4 mg





  BID PREET   Administration





     





     





     





     














- Exam


Eye: PERRL


Heart: RRR, no murmur, no gallops, no rubs, normal peripheral pulses


Respiratory: CTAB, no wheezes, no rales, no ronchi, normal chest expansion, no 

tachypnea, normal percussion


Gastrointestinal: soft, non-tender, non-distended, normal bowel sounds, no 

palpable masses, no hepatomegaly


Extremities: no cyanosis, no edema





Hosp A/P


(1) Pyelonephritis


Code(s): N12 - TUBULO-INTERSTITIAL NEPHRITIS, NOT SPCF AS ACUTE OR CHRONIC   

Status: Acute   





(2) Physical deconditioning


Code(s): R53.81 - OTHER MALAISE   Status: Chronic   





(3) Bladder cancer


Status: Acute   





(4) MSSA (methicillin susceptible Staphylococcus aureus) infection


Code(s): A49.01 - METHICILLIN SUSCEP STAPH INFECTION, UNSP SITE   Status: Acute

   





(5) Moderate protein-calorie malnutrition


Code(s): E44.0 - MODERATE PROTEIN-CALORIE MALNUTRITION   Status: Chronic   





(6) Sepsis due to Staphylococcus aureus


Code(s): A41.01 - SEPSIS DUE TO METHICILLIN SUSCEPTIBLE STAPHYLOCOCCUS AUREUS   

Status: Acute   





(7) Toxic metabolic encephalopathy


Code(s): G92 - TOXIC ENCEPHALOPATHY   Status: Acute   





- Plan





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

- he is s/p Bilateral Nephrostomy tube placement


* renal function continues to improve


* Pyelonephritis- sepsis and encephalopathy have resolved- continue Rocephin


* Culture from port is growing Enterococcus- awaiting sensitivities


* Bladder cancer- stable


* Deconditioning- continue PT/OT


* He will need 4 weeks of IV antibiotics- discussed with the patient and his 

wife-  they will decide on whether to send him to skilled nursing or home with 

home health and IV infusion at home. Also discussed with Case Management.

## 2020-03-26 VITALS — DIASTOLIC BLOOD PRESSURE: 67 MMHG | SYSTOLIC BLOOD PRESSURE: 136 MMHG | TEMPERATURE: 98.3 F

## 2020-03-26 LAB
ANION GAP SERPL CALC-SCNC: 14 MMOL/L (ref 10–20)
BUN SERPL-MCNC: 34 MG/DL (ref 8.4–25.7)
CALCIUM SERPL-MCNC: 8.4 MG/DL (ref 7.8–10.44)
CHLORIDE SERPL-SCNC: 108 MMOL/L (ref 98–107)
CO2 SERPL-SCNC: 18 MMOL/L (ref 23–31)
CREAT CL PREDICTED SERPL C-G-VRATE: 25 ML/MIN (ref 70–130)
GLUCOSE SERPL-MCNC: 117 MG/DL (ref 83–110)
HGB BLD-MCNC: 7.3 G/DL (ref 14–18)
POTASSIUM SERPL-SCNC: 4.3 MMOL/L (ref 3.5–5.1)
SODIUM SERPL-SCNC: 136 MMOL/L (ref 136–145)

## 2020-03-26 RX ADMIN — CEFTRIAXONE SCH MLS: 2 INJECTION, POWDER, FOR SOLUTION INTRAMUSCULAR; INTRAVENOUS at 18:03

## 2020-03-26 RX ADMIN — Medication SCH ML: at 11:17

## 2020-03-26 RX ADMIN — ASPIRIN SCH MG: 81 TABLET ORAL at 08:48

## 2020-03-26 RX ADMIN — MOMETASONE FUROATE AND FORMOTEROL FUMARATE DIHYDRATE SCH PUFF: 200; 5 AEROSOL RESPIRATORY (INHALATION) at 18:43

## 2020-03-26 RX ADMIN — DOCUSATE SODIUM 50 MG AND SENNOSIDES 8.6 MG SCH TAB: 8.6; 5 TABLET, FILM COATED ORAL at 08:48

## 2020-03-26 RX ADMIN — MOMETASONE FUROATE AND FORMOTEROL FUMARATE DIHYDRATE SCH: 200; 5 AEROSOL RESPIRATORY (INHALATION) at 07:00

## 2020-03-26 RX ADMIN — HYDROCODONE BITARTRATE AND ACETAMINOPHEN PRN TAB: 5; 325 TABLET ORAL at 06:42

## 2020-03-26 NOTE — PDOC.HOSPP
- Subjective


Encounter Date: 03/26/20


Encounter Time: 15:32


Subjective: 





Mr. Mckeon was seen today in follow-up of pyelonephritis and sepsis. He does not 

have any complaints this afternoon. His wife says he got up in the middle of 

the night and went to the bathroom on his own. He also walked with PT earlier 

today.





- Objective


Vital Signs & Weight: 


 Vital Signs (12 hours)











  Temp Pulse Resp BP Pulse Ox


 


 03/26/20 08:49   85   


 


 03/26/20 07:08  98.1 F  85  20  120/55 L  98








 Weight











Admit Weight                   138 lb 11.2 oz


 


Weight                         139 lb 5.314 oz














I&O: 


 











 03/25/20 03/26/20 03/27/20





 06:59 06:59 06:59


 


Intake Total  1100 177


 


Output Total 5852 7416 


 


Balance -5186 -1520 177











Result Diagrams: 


 03/26/20 05:24





 03/26/20 03:30





Hospitalist ROS





- Medication


Medications: 


Active Medications











Generic Name Dose Route Start Last Admin





  Trade Name Freq  PRN Reason Stop Dose Admin


 


Acetaminophen  1,000 mg  03/14/20 09:45  03/24/20 15:29





  Tylenol  PO   1,000 mg





  Q6H PRN   Administration





  Mild Pain (1-3)   





     





     





     


 


Hydrocodone Bitart/Acetaminophen  1 tab  03/24/20 17:57  03/26/20 06:42





  [place] 5/325  PO  03/26/20 17:58  1 tab





  Q6H PRN   Administration





  Pain   





     





     





     


 


Amlodipine Besylate  5 mg  03/15/20 09:00  03/26/20 08:49





  Norvasc  PO   5 mg





  DAILY PREET   Administration





     





     





     





     


 


Aspirin  81 mg  03/15/20 09:00  03/26/20 08:48





  Ecotrin  PO   81 mg





  DAILY PREET   Administration





     





     





     





     


 


Bisacodyl  10 mg  03/14/20 10:07  03/15/20 08:26





  Dulcolax  OK   10 mg





  Q8H PRN   Administration





  Constipation   





     





     





     


 


Famotidine  20 mg  03/18/20 09:00  03/26/20 08:49





  Pepcid  PO   20 mg





  DAILY PREET   Administration





     





     





     





     


 


Gabapentin  100 mg  03/14/20 21:00  03/25/20 20:50





  Neurontin  PO   100 mg





  HS PREET   Administration





     





     





     





     


 


Hydralazine HCl  10 mg  03/14/20 09:45  03/19/20 08:42





  Apresoline  SLOW IVP   10 mg





  Q4H PRN   Administration





  SBP > 180 and HR < 70   





     





     





     


 


Sodium Chloride  1,000 mls @ 50 mls/hr  03/22/20 10:57  03/26/20 08:49





  1/2 Normal Saline  IV   1,000 mls





  .Q20H PREET   Administration





     





     





     





     


 


Ceftriaxone Sodium 2 gm/  100 mls @ 200 mls/hr  03/23/20 18:00  03/25/20 17:35





  Sodium Chloride  IVPB   100 mls





  Q24HR PREET   Administration





     





     





     





     


 


Ampicillin Sodium 2 gm/ Sodium  100 mls @ 200 mls/hr  03/25/20 21:00  03/26/20 

08:49





  Chloride  IVPB   100 mls





  Q12HR PREET   Administration





     





     





     





     


 


Mometasone Furoate/Formoterol Fumar  2 puff  03/14/20 18:30  03/26/20 07:00





  Dulera 200 Mcg/5 Mcg Inhaler  INH   Not Given





  BID-RT PREET   





     





     





     





     


 


Senna/Docusate Sodium  2 tab  03/15/20 09:00  03/26/20 08:48





  Senokot S  PO   2 tab





  DAILY PREET   Administration





     





     





     





     


 


Sodium Chloride  10 ml  03/14/20 21:00  03/26/20 11:17





  Flush - Normal Saline  IVF   10 ml





  Q12HR PREET   Administration





     





     





     





     


 


Sodium Chloride  10 ml  03/14/20 09:52  03/26/20 08:50





  Flush - Normal Saline  IVF   10 ml





  PRN PRN   Administration





  Saline Flush   





     





     





     


 


Tamsulosin HCl  0.4 mg  03/14/20 21:00  03/26/20 08:48





  Flomax  PO   0.4 mg





  BID PREET   Administration





     





     





     





     














- Exam


Eye: PERRL


Heart: RRR, no murmur, no gallops, no rubs, normal peripheral pulses


Respiratory: CTAB, no wheezes, no rales, no ronchi, normal chest expansion


Gastrointestinal: soft, non-tender, non-distended, normal bowel sounds, no 

palpable masses


Extremities: no cyanosis, no clubbing, no edema





Hosp A/P


(1) Pyelonephritis


Code(s): N12 - TUBULO-INTERSTITIAL NEPHRITIS, NOT SPCF AS ACUTE OR CHRONIC   

Status: Acute   





(2) Physical deconditioning


Code(s): R53.81 - OTHER MALAISE   Status: Chronic   





(3) Bladder cancer


Status: Acute   





(4) MSSA (methicillin susceptible Staphylococcus aureus) infection


Code(s): A49.01 - METHICILLIN SUSCEP STAPH INFECTION, UNSP SITE   Status: Acute

   





(5) Moderate protein-calorie malnutrition


Code(s): E44.0 - MODERATE PROTEIN-CALORIE MALNUTRITION   Status: Chronic   





(6) Sepsis due to Staphylococcus aureus


Code(s): A41.01 - SEPSIS DUE TO METHICILLIN SUSCEPTIBLE STAPHYLOCOCCUS AUREUS   

Status: Acute   





(7) Toxic metabolic encephalopathy


Code(s): G92 - TOXIC ENCEPHALOPATHY   Status: Acute   





- Plan





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

- he is s/p Bilateral Nephrostomy tube placement


* Pyelonephritis with - sepsis - continue Rocephin


* Bladder cancer- stable


* Deconditioning- continue PT/OT


* The patient and his wife have decided on Outpatient Antibiotics at home. 

Arrangements are in progress

## 2020-03-26 NOTE — PRG
DATE OF SERVICE:  03/26/2020



SUBJECT:  A 79-year-old gentleman, being seen for acute kidney injury.  The patient

denied nausea, vomiting, or chest pain. 



OBJECTIVE:  General:  The patient is awake and alert. 

Vital Signs:  Afebrile, pulse __________ breathing 16, and blood pressure 138/71. 

HEENT:  Head normocephalic and atraumatic.  Eyes intact, no ulcers.  Nose intact, no

ulcers.  Ears intact, no ulcers. 

Neck:  Supple.  No JVD. 

Chest:  Symmetrical and clear. 

Cardiovascular:  Shows S1 and S2, no rub, no murmur. 

Gastrointestinal:  Abdomen is soft, bowel sounds positive. 

Extremities:  Show no edema or ulcers. 

Skin:  Shows no rash or petechiae. 

Musculoskeletal:  Shows no joint swelling or stiffness. 

Genitourinary:  Shows no Del Angel or CVA tenderness. 

Neurologic:  Motor intact.  Cranial nerves intact.



LABORATORY DATA:  Hemoglobin 7.3.  Potassium 4.8, bicarb 18, creatinine 2.1.



ASSESSMENT AND PLAN:  

1. Acute kidney injury, chronic kidney disease stage 4, stable.

2. Hypertension, stable.

3. Anemia.  We would recommend transfusion.

4. Metabolic acidosis.  We would recommend sodium bicarbonate. 



I will sign off on this patient.  Please reconsult as needed.







Job ID:  [id number]

## 2020-03-28 NOTE — PQF
LISA KELLER TONI MD

[id number]                                                             [id number]
[id number]

[id number]                             

                                   

CLINICAL DOCUMENTATION CLARIFICATION FORM:  POST DISCHARGE



Addendum to original discharge summary date:  __________________________________
____



Late entry note date:  _________________________________________________________
__











DATE: 03/28/2020                                           ATTN:RUBEN GAMBINO MD



Please exercise your independent, professional judgment in responding to the 
clarification form. 

Clinical indicators are provided on the bottom of this form for your review



Please check appropriate box(s):

[  ] Acute pyelonephritis is a complication of Nephrostomy tube

[ X ] Acute pyelonephritis is not a complication of Nephrostomy tube

[ X ] Other diagnosis ___Pyelonephritis due to ureteral STENT________

[  ] Unable to determine





CLINICAL INDICATORS - SIGNS / SYMPTOMS / LABS

- Sepsis syndrome secondary to UTI- H&P, 03/18, Natalio Ruth DO

- Concerned his nephrostomy tube might be clogged- ED record, 03/14, Jodee Lynne MD

- at this time to remove the left ureteral stent as it is likely seeded with 
bacteria-OP report, 03/25, Juju Hammond MD

- MSSA infection-Hospital PN, 03/26, RUBEN GAMBINO MD



RISK FACTORS

- Bladder carcinoma- H&P, 03/18, Natalio Ruth DO

- Acute on chronic kidney injury due to obstructive uropathy from bladder cancer
-Hospital PN, 03/26, RUBEN GAMBINO MD



TREATMENT:

-Cystoscopy, left ureteral stent removal- OP report, 03/25, Juju Hammond MD

- Vancomycin.IV- MAR, 03/14



(This form is maintained as a part of the permanent medical record)

2015 [employer]  All Rights Reserved

[email or web address]    [phone]

                                                              



CE